# Patient Record
Sex: MALE | Race: WHITE | Employment: FULL TIME | URBAN - METROPOLITAN AREA
[De-identification: names, ages, dates, MRNs, and addresses within clinical notes are randomized per-mention and may not be internally consistent; named-entity substitution may affect disease eponyms.]

---

## 2017-06-13 ENCOUNTER — HOSPITAL ENCOUNTER (EMERGENCY)
Age: 72
Discharge: HOME | End: 2017-06-13
Attending: EMERGENCY MEDICINE
Payer: MEDICARE

## 2017-06-13 ENCOUNTER — APPOINTMENT (OUTPATIENT)
Dept: MRI IMAGING | Age: 72
End: 2017-06-13
Attending: EMERGENCY MEDICINE
Payer: MEDICARE

## 2017-06-13 ENCOUNTER — APPOINTMENT (OUTPATIENT)
Dept: GENERAL RADIOLOGY | Age: 72
End: 2017-06-13
Attending: EMERGENCY MEDICINE
Payer: MEDICARE

## 2017-06-13 VITALS
BODY MASS INDEX: 29.18 KG/M2 | DIASTOLIC BLOOD PRESSURE: 70 MMHG | HEIGHT: 69 IN | RESPIRATION RATE: 16 BRPM | WEIGHT: 197 LBS | SYSTOLIC BLOOD PRESSURE: 150 MMHG | TEMPERATURE: 97.7 F | HEART RATE: 71 BPM | OXYGEN SATURATION: 96 %

## 2017-06-13 DIAGNOSIS — M25.552 HIP PAIN, ACUTE, LEFT: Primary | ICD-10-CM

## 2017-06-13 PROCEDURE — 74011250636 HC RX REV CODE- 250/636: Performed by: EMERGENCY MEDICINE

## 2017-06-13 PROCEDURE — 96372 THER/PROPH/DIAG INJ SC/IM: CPT | Performed by: EMERGENCY MEDICINE

## 2017-06-13 PROCEDURE — 99284 EMERGENCY DEPT VISIT MOD MDM: CPT | Performed by: EMERGENCY MEDICINE

## 2017-06-13 PROCEDURE — 73721 MRI JNT OF LWR EXTRE W/O DYE: CPT

## 2017-06-13 PROCEDURE — 73502 X-RAY EXAM HIP UNI 2-3 VIEWS: CPT

## 2017-06-13 PROCEDURE — 74011250637 HC RX REV CODE- 250/637: Performed by: EMERGENCY MEDICINE

## 2017-06-13 RX ORDER — LISINOPRIL 2.5 MG/1
2.5 TABLET ORAL
COMMUNITY

## 2017-06-13 RX ORDER — HYDROCODONE BITARTRATE AND ACETAMINOPHEN 5; 325 MG/1; MG/1
1 TABLET ORAL
COMMUNITY
End: 2017-07-21

## 2017-06-13 RX ORDER — OXYCODONE AND ACETAMINOPHEN 5; 325 MG/1; MG/1
1 TABLET ORAL
Qty: 20 TAB | Refills: 0 | Status: SHIPPED | OUTPATIENT
Start: 2017-06-13 | End: 2017-06-20

## 2017-06-13 RX ORDER — HYDROMORPHONE HYDROCHLORIDE 1 MG/ML
1 INJECTION, SOLUTION INTRAMUSCULAR; INTRAVENOUS; SUBCUTANEOUS
Status: COMPLETED | OUTPATIENT
Start: 2017-06-13 | End: 2017-06-13

## 2017-06-13 RX ORDER — GABAPENTIN 300 MG/1
300 CAPSULE ORAL 2 TIMES DAILY
COMMUNITY

## 2017-06-13 RX ORDER — METFORMIN HYDROCHLORIDE 1000 MG/1
500 TABLET ORAL DAILY
COMMUNITY

## 2017-06-13 RX ORDER — ONDANSETRON 8 MG/1
8 TABLET, ORALLY DISINTEGRATING ORAL
Status: COMPLETED | OUTPATIENT
Start: 2017-06-13 | End: 2017-06-13

## 2017-06-13 RX ORDER — ATORVASTATIN CALCIUM 80 MG/1
80 TABLET, FILM COATED ORAL
COMMUNITY
End: 2021-07-21

## 2017-06-13 RX ORDER — DEXAMETHASONE SODIUM PHOSPHATE 100 MG/10ML
10 INJECTION INTRAMUSCULAR; INTRAVENOUS
Status: COMPLETED | OUTPATIENT
Start: 2017-06-13 | End: 2017-06-13

## 2017-06-13 RX ADMIN — DEXAMETHASONE SODIUM PHOSPHATE 10 MG: 10 INJECTION INTRAMUSCULAR; INTRAVENOUS at 16:53

## 2017-06-13 RX ADMIN — HYDROMORPHONE HYDROCHLORIDE 1 MG: 1 INJECTION, SOLUTION INTRAMUSCULAR; INTRAVENOUS; SUBCUTANEOUS at 16:54

## 2017-06-13 RX ADMIN — ONDANSETRON 8 MG: 8 TABLET, ORALLY DISINTEGRATING ORAL at 16:54

## 2017-06-13 NOTE — LETTER
[facility] [place] [address] [address] 
[phone] Work/School Note Date: 6/13/2017 To Whom It May concern: 
 
Shannon Her was seen and treated today in the emergency room by the following provider(s): 
No providers found. Shannon Her may return to work on 6/15/2017.  
 
Sincerely, 
 
 
 
 
Denisa Bellamy RN

## 2017-06-13 NOTE — DISCHARGE INSTRUCTIONS
Hip Pain: Care Instructions  Your Care Instructions  Hip pain may be caused by many things, including overuse, a fall, or a twisting movement. Another cause of hip pain is arthritis. Your pain may increase when you stand up, walk, or squat. The pain may come and go or may be constant. Home treatment can help relieve hip pain, swelling, and stiffness. If your pain is ongoing, you may need more tests and treatment. Follow-up care is a key part of your treatment and safety. Be sure to make and go to all appointments, and call your doctor if you are having problems. Its also a good idea to know your test results and keep a list of the medicines you take. How can you care for yourself at home? · Take pain medicines exactly as directed. ¨ If the doctor gave you a prescription medicine for pain, take it as prescribed. ¨ If you are not taking a prescription pain medicine, ask your doctor if you can take an over-the-counter medicine. · Rest and protect your hip. Take a break from any activity, including standing or walking, that may cause pain. · Put ice or a cold pack against your hip for 10 to 20 minutes at a time. Try to do this every 1 to 2 hours for the next 3 days (when you are awake) or until the swelling goes down. Put a thin cloth between the ice and your skin. · Sleep on your healthy side with a pillow between your knees, or sleep on your back with pillows under your knees. · If there is no swelling, you can put moist heat, a heating pad, or a warm cloth on your hip. Do gentle stretching exercises to help keep your hip flexible. · Learn how to prevent falls. Have your vision and hearing checked regularly. Wear slippers or shoes with a nonskid sole. · Stay at a healthy weight. · Wear comfortable shoes. When should you call for help? Call 911 anytime you think you may need emergency care. For example, call if:  · You have sudden chest pain and shortness of breath, or you cough up blood.   · You are not able to stand or walk or bear weight. · Your buttocks, legs, or feet feel numb or tingly. · Your leg or foot is cool or pale or changes color. · You have severe pain. Call your doctor now or seek immediate medical care if:  · You have signs of infection, such as:  ¨ Increased pain, swelling, warmth, or redness in the hip area. ¨ Red streaks leading from the hip area. ¨ Pus draining from the hip area. ¨ A fever. · You have signs of a blood clot, such as:  ¨ Pain in your calf, back of the knee, thigh, or groin. ¨ Redness and swelling in your leg or groin. · You are not able to bend, straighten, or move your leg normally. · You have trouble urinating or having bowel movements. Watch closely for changes in your health, and be sure to contact your doctor if:  · You do not get better as expected. Where can you learn more? Go to http://ciara-norm.info/. Enter J330 in the search box to learn more about \"Hip Pain: Care Instructions. \"  Current as of: May 27, 2016  Content Version: 11.2  © 5789-0771 [employer]. Care instructions adapted under license by [employer] (which disclaims liability or warranty for this information). If you have questions about a medical condition or this instruction, always ask your healthcare professional. [place] [address] any warranty or liability for your use of this information.

## 2017-06-13 NOTE — ED NOTES
I have reviewed discharge instructions with the patient. Prescription given to pt. The patient verbalized understanding. Patient denies any further needs, questions, or concerns at this time. No adverse reactions to any treatments, meds, or procedures noted. Pt signed hard copy d/c form.

## 2017-06-13 NOTE — ED TRIAGE NOTES
Pt states he hurt his left hip a week ago. Pt has been seen by 3 different doctors and all have told him its his muscle. Pt has been given pain pills and had x rays.

## 2017-06-13 NOTE — ED PROVIDER NOTES
HPI Comments: Patient complains of left hip pain for the last week. States it started when he stood up from a chair one week ago. Has been able to bear minimal weight, seen at 2 different urgent cares as well as the [place] in [facility]. X-rays were read as negative, and he was told the pain persists he may need an MRI. Patient is a 66 y.o. male presenting with hip pain. The history is provided by the patient and the spouse. Hip Injury    This is a new problem. The current episode started more than 1 week ago. The problem occurs constantly. The problem has been gradually worsening. The pain is present in the left hip. The quality of the pain is described as aching, sharp and constant. The pain is at a severity of 10/10. Associated symptoms include limited range of motion and stiffness. Pertinent negatives include no numbness, no tingling, no itching, no back pain and no neck pain. The symptoms are aggravated by palpation, standing and movement. He has tried rest (pain meds and steroids) for the symptoms. The treatment provided no relief. There has been no history of extremity trauma. Past Medical History:   Diagnosis Date    Diabetes [facility])     Neurological disorder        Past Surgical History:   Procedure Laterality Date    CARDIAC SURG PROCEDURE UNLIST           History reviewed. No pertinent family history. Social History     Social History    Marital status:      Spouse name: N/A    Number of children: N/A    Years of education: N/A     Occupational History    Not on file. Social History Main Topics    Smoking status: Former Smoker    Smokeless tobacco: Not on file    Alcohol use No    Drug use: No    Sexual activity: Not on file     Other Topics Concern    Not on file     Social History Narrative    No narrative on file         ALLERGIES: Review of patient's allergies indicates no known allergies. Review of Systems   Constitutional: Negative for chills and fever. Musculoskeletal: Positive for arthralgias and stiffness. Negative for back pain and neck pain. Skin: Negative for itching. Neurological: Negative for tingling and numbness. All other systems reviewed and are negative. Vitals:    06/13/17 1007   BP: 131/82   Pulse: 78   Resp: 18   Temp: 97.8 °F (36.6 °C)   SpO2: 96%   Weight: 89.4 kg (197 lb)   Height: 5' 8.5\" (1.74 m)            Physical Exam   Constitutional: He is oriented to person, place, and time. He appears well-developed and well-nourished. He appears distressed. HENT:   Head: Normocephalic and atraumatic. Right Ear: Tympanic membrane and external ear normal.   Left Ear: Tympanic membrane and external ear normal.   Mouth/Throat: Oropharynx is clear and moist.   Eyes: Conjunctivae and EOM are normal. Pupils are equal, round, and reactive to light. Neck: Normal range of motion. Neck supple. No tracheal deviation present. Cardiovascular: Normal rate, regular rhythm, normal heart sounds and intact distal pulses. Exam reveals no gallop and no friction rub. No murmur heard. Pulmonary/Chest: Effort normal and breath sounds normal. No respiratory distress. He has no wheezes. Abdominal: Soft. Bowel sounds are normal. He exhibits no distension and no mass. There is no hepatosplenomegaly. There is no tenderness. There is no rebound and no guarding. Musculoskeletal: He exhibits no edema. Left hip: He exhibits decreased range of motion and tenderness. He exhibits no swelling, no crepitus, no deformity and no laceration. Lymphadenopathy:     He has no cervical adenopathy. Neurological: He is alert and oriented to person, place, and time. He displays normal reflexes. No cranial nerve deficit. Skin: Skin is warm and dry. No rash noted. He is not diaphoretic. No erythema. Psychiatric: He has a normal mood and affect. Nursing note and vitals reviewed.        MDM  Number of Diagnoses or Management Options  Hip pain, acute, left: new and requires workup     Amount and/or Complexity of Data Reviewed  Tests in the radiology section of CPT®: ordered and reviewed  Obtain history from someone other than the patient: yes  Review and summarize past medical records: yes    Risk of Complications, Morbidity, and/or Mortality  Presenting problems: high  Diagnostic procedures: high  Management options: moderate    Patient Progress  Patient progress: improved    ED Course       Procedures    The patient was observed in the ED. Results Reviewed:      MRI HIP LT WO CONT   Final Result   IMPRESSION:   1. No acute musculoskeletal abnormality evident. 2. 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 3. Degenerative disc disease at L5-S1. XR HIP LT W OR WO PELV 2-3 VWS   Final Result   Impression:  No evidence of acute injury. I discussed the results of all labs, procedures, radiographs, and treatments with the patient and available family. Treatment plan is agreed upon and the patient is ready for discharge. All voiced understanding of the discharge plan and medication instructions or changes as appropriate. Questions about treatment in the ED were answered. All were encouraged to return should symptoms worsen or new problems develop.

## 2017-06-13 NOTE — ED NOTES
\"Pending outpatient medications\": hydrocodone 5mg/acetaminophen 325mg take 1 tab q 4 hours PRN; tramcinolone inj suspension 40mg IM once.

## 2017-07-12 ENCOUNTER — HOSPITAL ENCOUNTER (OUTPATIENT)
Dept: SURGERY | Age: 72
Discharge: HOME | End: 2017-07-12
Payer: COMMERCIAL

## 2017-07-12 VITALS
DIASTOLIC BLOOD PRESSURE: 72 MMHG | SYSTOLIC BLOOD PRESSURE: 117 MMHG | RESPIRATION RATE: 20 BRPM | HEART RATE: 87 BPM | HEIGHT: 69 IN | BODY MASS INDEX: 27.55 KG/M2 | TEMPERATURE: 97.6 F | WEIGHT: 186 LBS | OXYGEN SATURATION: 96 %

## 2017-07-12 LAB
ATRIAL RATE: 86 BPM
BACTERIA SPEC CULT: ABNORMAL
CALCULATED P AXIS, ECG09: 69 DEGREES
CALCULATED R AXIS, ECG10: 88 DEGREES
CALCULATED T AXIS, ECG11: 70 DEGREES
DIAGNOSIS, 93000: NORMAL
EST. AVERAGE GLUCOSE BLD GHB EST-MCNC: 217 MG/DL
GLUCOSE BLD STRIP.AUTO-MCNC: 161 MG/DL (ref 65–100)
HBA1C MFR BLD: 9.2 % (ref 4.8–6)
HGB BLD-MCNC: 14.7 G/DL (ref 13.6–17.2)
P-R INTERVAL, ECG05: 138 MS
Q-T INTERVAL, ECG07: 366 MS
QRS DURATION, ECG06: 104 MS
QTC CALCULATION (BEZET), ECG08: 437 MS
SERVICE CMNT-IMP: ABNORMAL
VENTRICULAR RATE, ECG03: 86 BPM

## 2017-07-12 PROCEDURE — 93005 ELECTROCARDIOGRAM TRACING: CPT | Performed by: ANESTHESIOLOGY

## 2017-07-12 PROCEDURE — 85018 HEMOGLOBIN: CPT | Performed by: ANESTHESIOLOGY

## 2017-07-12 PROCEDURE — 87641 MR-STAPH DNA AMP PROBE: CPT | Performed by: ANESTHESIOLOGY

## 2017-07-12 PROCEDURE — 77030027138 HC INCENT SPIROMETER -A

## 2017-07-12 PROCEDURE — 83036 HEMOGLOBIN GLYCOSYLATED A1C: CPT | Performed by: ANESTHESIOLOGY

## 2017-07-12 PROCEDURE — 82962 GLUCOSE BLOOD TEST: CPT

## 2017-07-12 RX ORDER — GUAIFENESIN 100 MG/5ML
81 LIQUID (ML) ORAL
COMMUNITY

## 2017-07-12 NOTE — PERIOP NOTES
POC glucose 161 . Instructed  Patient that if blood sugar 300 or > , surgery may be cancelled. MSSA/MRSA nasal swab done and sent to lab. Instructed pt that they would receive notification of results of positive swab. Education sheet regarding documentation of dosages with instructions for use of mupirocin ointment given. Pt aware to bring sheet on the day of surgery if ointment used and to  discard sheet if swab is negative . Patient verified name, , and surgery as listed in [facility] Care. TYPE  CASE:2  Orders per surgeon:   Received  Labs per surgeon:mrsa/mssa, hga1c: results pending  Labs per anesthesia protocol: hgb : results pending  EKG  :  Today per grid. Patient provided with handouts including guide to surgery , transfusions, pain management and hand hygiene for the family and community. Pt verbalizes understanding of all pre-op instructions . Instructed that family must be present in building at all times. Nothing to eat or drink after midnight the night prior to surgery. hibiclens  and instructions given per hospital policy. Sponge given. Incentive spirometry with return demonstration. Instructed patient to continue  previous medications as prescribed prior to surgery and  to take the following medications the day of surgery according to anesthesia guidelines : baby aspirin, atorvastatin, gabapentin, 1/2 of usual am dose of insulin, call [phone] for any blood sugar problems. Check blood sugar prior to insulin administration. May correct low blood sugar with sips of clear liquids only. norco if needed       Original medication prescription bottles not visualized during patient appointment. Continue all previous medications unless otherwise directed. Instructed patient to hold  the following medications prior to surgery: none          Patient verbalized understanding of all instructions and provided all medical/health information to the best of their ability.

## 2017-07-12 NOTE — PERIOP NOTES
Left message with Dr. Heath Doctor assistant that pt must resume baby aspirin due to two cardiac stents. Notified Dr. Alexx Mendoza that pt has been off his baby aspirin for 7 days and was instructed per this nurse to resume today.

## 2017-07-12 NOTE — PERIOP NOTES
Recent Results (from the past 12 hour(s))   MSSA/MRSA SC BY PCR, NASAL SWAB    Collection Time: 07/12/17 11:40 AM   Result Value Ref Range    Special Requests: NO SPECIAL REQUESTS      Culture result: (A)       MRSA target DNA not detected, SA target DNA detected. A MRSA negative, SA positive test result does not preclude MRSA nasal colonization.    HEMOGLOBIN A1C WITH EAG    Collection Time: 07/12/17 11:40 AM   Result Value Ref Range    Hemoglobin A1c 9.2 (H) 4.8 - 6.0 %    Est. average glucose 217 mg/dL   HEMOGLOBIN    Collection Time: 07/12/17 11:40 AM   Result Value Ref Range    HGB 14.7 13.6 - 17.2 g/dL   GLUCOSE, POC    Collection Time: 07/12/17 11:46 AM   Result Value Ref Range    Glucose (POC) 161 (H) 65 - 100 mg/dL

## 2017-07-12 NOTE — PERIOP NOTES
MRSA target DNA not detected, SA target DNA detected.   A MRSA negative, SA positive test result does not preclude MRSA nasal colonization    Left message with with Maranda Vicente at office of + swab and of pt's abnormal hga1c. Called rx for mupirocin joselin into [facility] and notified pt's wife to begin mupirocin joselin today for two doses intranasally , dose in the am and bring joselin to hospital on the [place]. Verified understanding.

## 2017-07-19 ENCOUNTER — ANESTHESIA EVENT (OUTPATIENT)
Dept: SURGERY | Age: 72
End: 2017-07-19
Payer: COMMERCIAL

## 2017-07-19 NOTE — H&P
Date of Service: 2017-07-12  Work Status:  ????? Allergies:????? Medications:Aspirin (81 MG); Ativan; Atorvastatin Calcium;Gabapentin;Lisinopril;MetFORMIN HCl;Norco;Oxycodone-Acetaminophen (5-325 MG); Percocet (5-325 MG, Take 1 po q 4-6 hours prn pain)    CC: Pre op    He is a 71-YO gentleman who had acute onset of low back and left leg pain on June 6th after getting up to a standing position. It does radiate into the anterior left thigh, but a lot of pain in the buttock. It feels like his buttock is swollen. He has taken some pain meds, but his pain has not resolved over time. He has also done NSAIDs and muscle relaxers. He is unable to do therapy because of the pain and he cant really sit for very long at all. He has an MRI scan that shows several levels of mild stenosis, but what looks acute is a disc herniation at L2-3 on the left with extruded fragment sitting underneath the L3 nerve root. I think this is the source of pain. We are planning to do surgery tomorrow. It will be a left L2-3 laminotomy/discectomy. We discussed the surgery in detail and hospital stay. I told him it is usually just overnight and then the next day we have him ambulate with therapy and he will have restrictions for 6 weeks and then we will get him back to regular activities. I talked about the dressing. I went through the risks including death, infection, paralysis, heart attack, stroke, bleeding, transfusion, clot in leg, clot in lung, dural tear, recurrent disc herniations and the facet I cannot guarantee pain relief. He understands. I answered all of his questions. EXAM:   He looks his age, but is healthy. No gross deformities. He is alert and oriented x 3. HEENT:  Pupils are somewhat constricted and minimally reactive to light. Oropharynx is clear. Neck is without adenopathy or bruits. Lungs are clear to auscultation bilaterally. Heart is RRR without murmur.  Abdomen is soft and nontender without any hepatosplenomegaly. I went through his medical history. He is an insulin dependent diabetic and really does not have other medical problems. He has had stents placed in his heart about 13 years ago. The only blood thinner he takes is ASA. He does have peripheral neuropathy in his feet from diabetes and takes Gabapentin. He has NKDA. PLAN:  Surgery tomorrow, left L2-3 discectomy.     Electronically Signed By Sandra BURK/dorothy

## 2017-07-20 ENCOUNTER — ANESTHESIA (OUTPATIENT)
Dept: SURGERY | Age: 72
End: 2017-07-20
Payer: COMMERCIAL

## 2017-07-20 ENCOUNTER — HOSPITAL ENCOUNTER (OUTPATIENT)
Age: 72
Setting detail: OBSERVATION
Discharge: HOME | End: 2017-07-21
Attending: ORTHOPAEDIC SURGERY | Admitting: ORTHOPAEDIC SURGERY
Payer: COMMERCIAL

## 2017-07-20 ENCOUNTER — APPOINTMENT (OUTPATIENT)
Dept: GENERAL RADIOLOGY | Age: 72
End: 2017-07-20
Attending: ORTHOPAEDIC SURGERY
Payer: COMMERCIAL

## 2017-07-20 PROBLEM — M51.26 LUMBAR HERNIATED DISC: Status: ACTIVE | Noted: 2017-07-20

## 2017-07-20 LAB
GLUCOSE BLD STRIP.AUTO-MCNC: 153 MG/DL (ref 65–100)
GLUCOSE BLD STRIP.AUTO-MCNC: 184 MG/DL (ref 65–100)
GLUCOSE BLD STRIP.AUTO-MCNC: 185 MG/DL (ref 65–100)
GLUCOSE BLD STRIP.AUTO-MCNC: 186 MG/DL (ref 65–100)

## 2017-07-20 PROCEDURE — 74011250636 HC RX REV CODE- 250/636

## 2017-07-20 PROCEDURE — 77030032490 HC SLV COMPR SCD KNE COVD -B: Performed by: ORTHOPAEDIC SURGERY

## 2017-07-20 PROCEDURE — 77030025623 HC BUR RND PRECIS STRY -D: Performed by: ORTHOPAEDIC SURGERY

## 2017-07-20 PROCEDURE — 77030019940 HC BLNKT HYPOTHRM STRY -B: Performed by: ANESTHESIOLOGY

## 2017-07-20 PROCEDURE — 74011636637 HC RX REV CODE- 636/637: Performed by: ORTHOPAEDIC SURGERY

## 2017-07-20 PROCEDURE — 74011250637 HC RX REV CODE- 250/637: Performed by: ORTHOPAEDIC SURGERY

## 2017-07-20 PROCEDURE — 82962 GLUCOSE BLOOD TEST: CPT

## 2017-07-20 PROCEDURE — 77030014647 HC SEAL FBRN TISSL BAXT -D: Performed by: ORTHOPAEDIC SURGERY

## 2017-07-20 PROCEDURE — 77030031139 HC SUT VCRL2 J&J -A: Performed by: ORTHOPAEDIC SURGERY

## 2017-07-20 PROCEDURE — 77030012894: Performed by: ORTHOPAEDIC SURGERY

## 2017-07-20 PROCEDURE — 74011000250 HC RX REV CODE- 250: Performed by: ORTHOPAEDIC SURGERY

## 2017-07-20 PROCEDURE — 74011000272 HC RX REV CODE- 272: Performed by: ORTHOPAEDIC SURGERY

## 2017-07-20 PROCEDURE — 77030008477 HC STYL SATN SLP COVD -A: Performed by: ANESTHESIOLOGY

## 2017-07-20 PROCEDURE — 77030003028 HC SUT VCRL J&J -A: Performed by: ORTHOPAEDIC SURGERY

## 2017-07-20 PROCEDURE — 77030030163 HC BN WAX J&J -A: Performed by: ORTHOPAEDIC SURGERY

## 2017-07-20 PROCEDURE — 76010000161 HC OR TIME 1 TO 1.5 HR INTENSV-TIER 1: Performed by: ORTHOPAEDIC SURGERY

## 2017-07-20 PROCEDURE — 74011250636 HC RX REV CODE- 250/636: Performed by: ORTHOPAEDIC SURGERY

## 2017-07-20 PROCEDURE — 77030018836 HC SOL IRR NACL ICUM -A: Performed by: ORTHOPAEDIC SURGERY

## 2017-07-20 PROCEDURE — 77030008703 HC TU ET UNCUF COVD -A: Performed by: ANESTHESIOLOGY

## 2017-07-20 PROCEDURE — 72020 X-RAY EXAM OF SPINE 1 VIEW: CPT

## 2017-07-20 PROCEDURE — 77030019908 HC STETH ESOPH SIMS -A: Performed by: ANESTHESIOLOGY

## 2017-07-20 PROCEDURE — 76210000007 HC OR PH I REC 5.5 TO 6 HR: Performed by: ORTHOPAEDIC SURGERY

## 2017-07-20 PROCEDURE — 99218 HC RM OBSERVATION: CPT

## 2017-07-20 PROCEDURE — 77030011640 HC PAD GRND REM COVD -A: Performed by: ORTHOPAEDIC SURGERY

## 2017-07-20 PROCEDURE — 74011250636 HC RX REV CODE- 250/636: Performed by: ANESTHESIOLOGY

## 2017-07-20 PROCEDURE — 74011250637 HC RX REV CODE- 250/637: Performed by: ANESTHESIOLOGY

## 2017-07-20 PROCEDURE — 74011000250 HC RX REV CODE- 250

## 2017-07-20 PROCEDURE — 76060000033 HC ANESTHESIA 1 TO 1.5 HR: Performed by: ORTHOPAEDIC SURGERY

## 2017-07-20 RX ORDER — ONDANSETRON 2 MG/ML
4 INJECTION INTRAMUSCULAR; INTRAVENOUS
Status: DISCONTINUED | OUTPATIENT
Start: 2017-07-20 | End: 2017-07-21 | Stop reason: HOSPADM

## 2017-07-20 RX ORDER — OXYCODONE HYDROCHLORIDE 5 MG/1
10 TABLET ORAL
Status: DISCONTINUED | OUTPATIENT
Start: 2017-07-20 | End: 2017-07-20 | Stop reason: HOSPADM

## 2017-07-20 RX ORDER — LISINOPRIL 5 MG/1
2.5 TABLET ORAL DAILY
Status: DISCONTINUED | OUTPATIENT
Start: 2017-07-21 | End: 2017-07-21 | Stop reason: HOSPADM

## 2017-07-20 RX ORDER — ROCURONIUM BROMIDE 10 MG/ML
INJECTION, SOLUTION INTRAVENOUS AS NEEDED
Status: DISCONTINUED | OUTPATIENT
Start: 2017-07-20 | End: 2017-07-20 | Stop reason: HOSPADM

## 2017-07-20 RX ORDER — BUPIVACAINE HYDROCHLORIDE AND EPINEPHRINE 5; 5 MG/ML; UG/ML
INJECTION, SOLUTION EPIDURAL; INTRACAUDAL; PERINEURAL AS NEEDED
Status: DISCONTINUED | OUTPATIENT
Start: 2017-07-20 | End: 2017-07-20 | Stop reason: HOSPADM

## 2017-07-20 RX ORDER — CEFAZOLIN SODIUM IN 0.9 % NACL 2 G/50 ML
2 INTRAVENOUS SOLUTION, PIGGYBACK (ML) INTRAVENOUS ONCE
Status: COMPLETED | OUTPATIENT
Start: 2017-07-20 | End: 2017-07-20

## 2017-07-20 RX ORDER — LIDOCAINE HYDROCHLORIDE 20 MG/ML
INJECTION, SOLUTION EPIDURAL; INFILTRATION; INTRACAUDAL; PERINEURAL AS NEEDED
Status: DISCONTINUED | OUTPATIENT
Start: 2017-07-20 | End: 2017-07-20 | Stop reason: HOSPADM

## 2017-07-20 RX ORDER — HYDROCODONE BITARTRATE AND ACETAMINOPHEN 7.5; 325 MG/1; MG/1
1 TABLET ORAL
Qty: 30 TAB | Refills: 0 | Status: SHIPPED | OUTPATIENT
Start: 2017-07-20 | End: 2021-07-21

## 2017-07-20 RX ORDER — LIDOCAINE HYDROCHLORIDE 10 MG/ML
0.1 INJECTION INFILTRATION; PERINEURAL AS NEEDED
Status: DISCONTINUED | OUTPATIENT
Start: 2017-07-20 | End: 2017-07-20 | Stop reason: HOSPADM

## 2017-07-20 RX ORDER — SODIUM CHLORIDE, SODIUM LACTATE, POTASSIUM CHLORIDE, CALCIUM CHLORIDE 600; 310; 30; 20 MG/100ML; MG/100ML; MG/100ML; MG/100ML
75 INJECTION, SOLUTION INTRAVENOUS CONTINUOUS
Status: DISCONTINUED | OUTPATIENT
Start: 2017-07-20 | End: 2017-07-20 | Stop reason: HOSPADM

## 2017-07-20 RX ORDER — GABAPENTIN 300 MG/1
300 CAPSULE ORAL 2 TIMES DAILY
Status: DISCONTINUED | OUTPATIENT
Start: 2017-07-20 | End: 2017-07-21 | Stop reason: HOSPADM

## 2017-07-20 RX ORDER — OXYCODONE HYDROCHLORIDE 5 MG/1
10 TABLET ORAL
Status: DISCONTINUED | OUTPATIENT
Start: 2017-07-20 | End: 2017-07-20

## 2017-07-20 RX ORDER — HYDROMORPHONE HYDROCHLORIDE 1 MG/ML
0.25 INJECTION, SOLUTION INTRAMUSCULAR; INTRAVENOUS; SUBCUTANEOUS
Status: DISCONTINUED | OUTPATIENT
Start: 2017-07-20 | End: 2017-07-20

## 2017-07-20 RX ORDER — ONDANSETRON 2 MG/ML
INJECTION INTRAMUSCULAR; INTRAVENOUS AS NEEDED
Status: DISCONTINUED | OUTPATIENT
Start: 2017-07-20 | End: 2017-07-20 | Stop reason: HOSPADM

## 2017-07-20 RX ORDER — HYDROMORPHONE HYDROCHLORIDE 1 MG/ML
1 INJECTION, SOLUTION INTRAMUSCULAR; INTRAVENOUS; SUBCUTANEOUS
Status: DISCONTINUED | OUTPATIENT
Start: 2017-07-20 | End: 2017-07-21 | Stop reason: HOSPADM

## 2017-07-20 RX ORDER — IBUPROFEN 400 MG/1
400 TABLET ORAL
Status: DISCONTINUED | OUTPATIENT
Start: 2017-07-20 | End: 2017-07-21 | Stop reason: HOSPADM

## 2017-07-20 RX ORDER — GLYCOPYRROLATE 0.2 MG/ML
INJECTION INTRAMUSCULAR; INTRAVENOUS AS NEEDED
Status: DISCONTINUED | OUTPATIENT
Start: 2017-07-20 | End: 2017-07-20 | Stop reason: HOSPADM

## 2017-07-20 RX ORDER — FENTANYL CITRATE 50 UG/ML
INJECTION, SOLUTION INTRAMUSCULAR; INTRAVENOUS AS NEEDED
Status: DISCONTINUED | OUTPATIENT
Start: 2017-07-20 | End: 2017-07-20 | Stop reason: HOSPADM

## 2017-07-20 RX ORDER — ONDANSETRON 2 MG/ML
4 INJECTION INTRAMUSCULAR; INTRAVENOUS
Status: DISCONTINUED | OUTPATIENT
Start: 2017-07-20 | End: 2017-07-20

## 2017-07-20 RX ORDER — NEOSTIGMINE METHYLSULFATE 1 MG/ML
INJECTION INTRAVENOUS AS NEEDED
Status: DISCONTINUED | OUTPATIENT
Start: 2017-07-20 | End: 2017-07-20 | Stop reason: HOSPADM

## 2017-07-20 RX ORDER — NALBUPHINE HYDROCHLORIDE 20 MG/ML
5 INJECTION, SOLUTION INTRAMUSCULAR; INTRAVENOUS; SUBCUTANEOUS
Status: DISCONTINUED | OUTPATIENT
Start: 2017-07-20 | End: 2017-07-20

## 2017-07-20 RX ORDER — DOCUSATE SODIUM 100 MG/1
100 CAPSULE, LIQUID FILLED ORAL
Status: DISCONTINUED | OUTPATIENT
Start: 2017-07-20 | End: 2017-07-21 | Stop reason: HOSPADM

## 2017-07-20 RX ORDER — PROPOFOL 10 MG/ML
INJECTION, EMULSION INTRAVENOUS AS NEEDED
Status: DISCONTINUED | OUTPATIENT
Start: 2017-07-20 | End: 2017-07-20 | Stop reason: HOSPADM

## 2017-07-20 RX ORDER — SODIUM CHLORIDE 0.9 % (FLUSH) 0.9 %
5-10 SYRINGE (ML) INJECTION AS NEEDED
Status: DISCONTINUED | OUTPATIENT
Start: 2017-07-20 | End: 2017-07-21 | Stop reason: HOSPADM

## 2017-07-20 RX ORDER — SODIUM CHLORIDE 0.9 % (FLUSH) 0.9 %
5-10 SYRINGE (ML) INJECTION EVERY 8 HOURS
Status: DISCONTINUED | OUTPATIENT
Start: 2017-07-20 | End: 2017-07-21 | Stop reason: HOSPADM

## 2017-07-20 RX ORDER — CYCLOBENZAPRINE HCL 10 MG
5 TABLET ORAL
Status: DISCONTINUED | OUTPATIENT
Start: 2017-07-20 | End: 2017-07-21 | Stop reason: HOSPADM

## 2017-07-20 RX ORDER — HYDROMORPHONE HYDROCHLORIDE 2 MG/ML
0.5 INJECTION, SOLUTION INTRAMUSCULAR; INTRAVENOUS; SUBCUTANEOUS
Status: DISCONTINUED | OUTPATIENT
Start: 2017-07-20 | End: 2017-07-20 | Stop reason: HOSPADM

## 2017-07-20 RX ORDER — SODIUM CHLORIDE 0.9 % (FLUSH) 0.9 %
5-10 SYRINGE (ML) INJECTION AS NEEDED
Status: DISCONTINUED | OUTPATIENT
Start: 2017-07-20 | End: 2017-07-20

## 2017-07-20 RX ORDER — HYDROCODONE BITARTRATE AND ACETAMINOPHEN 10; 325 MG/1; MG/1
1 TABLET ORAL
Status: DISCONTINUED | OUTPATIENT
Start: 2017-07-20 | End: 2017-07-21 | Stop reason: HOSPADM

## 2017-07-20 RX ORDER — NALOXONE HYDROCHLORIDE 0.4 MG/ML
0.1 INJECTION, SOLUTION INTRAMUSCULAR; INTRAVENOUS; SUBCUTANEOUS AS NEEDED
Status: DISCONTINUED | OUTPATIENT
Start: 2017-07-20 | End: 2017-07-20

## 2017-07-20 RX ORDER — FLUMAZENIL 0.1 MG/ML
0.2 INJECTION INTRAVENOUS AS NEEDED
Status: DISCONTINUED | OUTPATIENT
Start: 2017-07-20 | End: 2017-07-20 | Stop reason: HOSPADM

## 2017-07-20 RX ORDER — DIPHENHYDRAMINE HCL 25 MG
25 CAPSULE ORAL
Status: DISCONTINUED | OUTPATIENT
Start: 2017-07-20 | End: 2017-07-21 | Stop reason: HOSPADM

## 2017-07-20 RX ORDER — ATORVASTATIN CALCIUM 40 MG/1
80 TABLET, FILM COATED ORAL DAILY
Status: DISCONTINUED | OUTPATIENT
Start: 2017-07-21 | End: 2017-07-21 | Stop reason: HOSPADM

## 2017-07-20 RX ORDER — DIPHENHYDRAMINE HYDROCHLORIDE 50 MG/ML
12.5 INJECTION, SOLUTION INTRAMUSCULAR; INTRAVENOUS
Status: DISCONTINUED | OUTPATIENT
Start: 2017-07-20 | End: 2017-07-20 | Stop reason: HOSPADM

## 2017-07-20 RX ORDER — OXYCODONE HYDROCHLORIDE 5 MG/1
5 TABLET ORAL
Status: DISCONTINUED | OUTPATIENT
Start: 2017-07-20 | End: 2017-07-20

## 2017-07-20 RX ORDER — OXYCODONE HYDROCHLORIDE 5 MG/1
5 TABLET ORAL
Status: DISCONTINUED | OUTPATIENT
Start: 2017-07-20 | End: 2017-07-20 | Stop reason: HOSPADM

## 2017-07-20 RX ORDER — SODIUM CHLORIDE, SODIUM LACTATE, POTASSIUM CHLORIDE, CALCIUM CHLORIDE 600; 310; 30; 20 MG/100ML; MG/100ML; MG/100ML; MG/100ML
100 INJECTION, SOLUTION INTRAVENOUS CONTINUOUS
Status: DISCONTINUED | OUTPATIENT
Start: 2017-07-20 | End: 2017-07-21 | Stop reason: HOSPADM

## 2017-07-20 RX ORDER — METFORMIN HYDROCHLORIDE 500 MG/1
1000 TABLET ORAL 2 TIMES DAILY WITH MEALS
Status: DISCONTINUED | OUTPATIENT
Start: 2017-07-20 | End: 2017-07-21 | Stop reason: HOSPADM

## 2017-07-20 RX ORDER — FAMOTIDINE 20 MG/1
20 TABLET, FILM COATED ORAL EVERY 12 HOURS
Status: DISCONTINUED | OUTPATIENT
Start: 2017-07-20 | End: 2017-07-21 | Stop reason: HOSPADM

## 2017-07-20 RX ORDER — CEFAZOLIN SODIUM IN 0.9 % NACL 2 G/50 ML
2 INTRAVENOUS SOLUTION, PIGGYBACK (ML) INTRAVENOUS EVERY 8 HOURS
Status: COMPLETED | OUTPATIENT
Start: 2017-07-20 | End: 2017-07-21

## 2017-07-20 RX ORDER — NALOXONE HYDROCHLORIDE 0.4 MG/ML
0.1 INJECTION, SOLUTION INTRAMUSCULAR; INTRAVENOUS; SUBCUTANEOUS
Status: DISCONTINUED | OUTPATIENT
Start: 2017-07-20 | End: 2017-07-20 | Stop reason: HOSPADM

## 2017-07-20 RX ORDER — SODIUM CHLORIDE 0.9 % (FLUSH) 0.9 %
5-10 SYRINGE (ML) INJECTION EVERY 8 HOURS
Status: DISCONTINUED | OUTPATIENT
Start: 2017-07-20 | End: 2017-07-20

## 2017-07-20 RX ADMIN — CEFAZOLIN 2 G: 1 INJECTION, POWDER, FOR SOLUTION INTRAMUSCULAR; INTRAVENOUS; PARENTERAL at 16:10

## 2017-07-20 RX ADMIN — LIDOCAINE HYDROCHLORIDE 60 MG: 20 INJECTION, SOLUTION EPIDURAL; INFILTRATION; INTRACAUDAL; PERINEURAL at 07:38

## 2017-07-20 RX ADMIN — FAMOTIDINE 20 MG: 20 TABLET ORAL at 20:51

## 2017-07-20 RX ADMIN — PROPOFOL 150 MG: 10 INJECTION, EMULSION INTRAVENOUS at 07:38

## 2017-07-20 RX ADMIN — SODIUM CHLORIDE, SODIUM LACTATE, POTASSIUM CHLORIDE, AND CALCIUM CHLORIDE: 600; 310; 30; 20 INJECTION, SOLUTION INTRAVENOUS at 08:28

## 2017-07-20 RX ADMIN — ROCURONIUM BROMIDE 40 MG: 10 INJECTION, SOLUTION INTRAVENOUS at 07:38

## 2017-07-20 RX ADMIN — Medication 10 ML: at 16:10

## 2017-07-20 RX ADMIN — ONDANSETRON 4 MG: 2 INJECTION INTRAMUSCULAR; INTRAVENOUS at 08:25

## 2017-07-20 RX ADMIN — METFORMIN HYDROCHLORIDE 1000 MG: 500 TABLET, FILM COATED ORAL at 01:00

## 2017-07-20 RX ADMIN — NEOSTIGMINE METHYLSULFATE 4 MG: 1 INJECTION INTRAVENOUS at 08:35

## 2017-07-20 RX ADMIN — INSULIN HUMAN 55 UNITS: 100 INJECTION, SUSPENSION SUBCUTANEOUS at 20:54

## 2017-07-20 RX ADMIN — HYDROCODONE BITARTRATE AND ACETAMINOPHEN 1 TABLET: 10; 325 TABLET ORAL at 16:09

## 2017-07-20 RX ADMIN — FENTANYL CITRATE 50 MCG: 50 INJECTION, SOLUTION INTRAMUSCULAR; INTRAVENOUS at 08:01

## 2017-07-20 RX ADMIN — OXYCODONE HYDROCHLORIDE 10 MG: 5 TABLET ORAL at 09:41

## 2017-07-20 RX ADMIN — DOCUSATE SODIUM 100 MG: 100 CAPSULE, LIQUID FILLED ORAL at 11:57

## 2017-07-20 RX ADMIN — SODIUM CHLORIDE, SODIUM LACTATE, POTASSIUM CHLORIDE, AND CALCIUM CHLORIDE 100 ML/HR: 600; 310; 30; 20 INJECTION, SOLUTION INTRAVENOUS at 16:10

## 2017-07-20 RX ADMIN — FAMOTIDINE 20 MG: 20 TABLET ORAL at 16:09

## 2017-07-20 RX ADMIN — ROCURONIUM BROMIDE 10 MG: 10 INJECTION, SOLUTION INTRAVENOUS at 07:55

## 2017-07-20 RX ADMIN — SODIUM CHLORIDE, SODIUM LACTATE, POTASSIUM CHLORIDE, AND CALCIUM CHLORIDE 75 ML/HR: 600; 310; 30; 20 INJECTION, SOLUTION INTRAVENOUS at 06:25

## 2017-07-20 RX ADMIN — GABAPENTIN 300 MG: 300 CAPSULE ORAL at 16:09

## 2017-07-20 RX ADMIN — HYDROMORPHONE HYDROCHLORIDE 1 MG: 1 INJECTION, SOLUTION INTRAMUSCULAR; INTRAVENOUS; SUBCUTANEOUS at 20:51

## 2017-07-20 RX ADMIN — GLYCOPYRROLATE 0.6 MG: 0.2 INJECTION INTRAMUSCULAR; INTRAVENOUS at 08:35

## 2017-07-20 RX ADMIN — FENTANYL CITRATE 100 MCG: 50 INJECTION, SOLUTION INTRAMUSCULAR; INTRAVENOUS at 07:38

## 2017-07-20 RX ADMIN — CEFAZOLIN 2 G: 1 INJECTION, POWDER, FOR SOLUTION INTRAMUSCULAR; INTRAVENOUS; PARENTERAL at 07:41

## 2017-07-20 RX ADMIN — Medication 10 ML: at 22:57

## 2017-07-20 RX ADMIN — FENTANYL CITRATE 50 MCG: 50 INJECTION, SOLUTION INTRAMUSCULAR; INTRAVENOUS at 08:19

## 2017-07-20 NOTE — ANESTHESIA PROCEDURE NOTES
Epidural Block    Start time: 7/20/2017 7:07 AM  End time: 7/20/2017 7:13 AM  Performed by: Richard Weinberg by: Ivory Mendenhall     Pre-Procedure  Indication: at surgeon's request and post-op pain management    Preanesthetic Checklist: patient identified, risks and benefits discussed, anesthesia consent, site marked, patient being monitored, timeout performed and anesthesia consent    Timeout Time: 07:06        Epidural:   Patient position:  Seated  Prep region:  Thoracic  Prep: Patient draped and Chlorhexidine    Location:  T9-10    Needle and Epidural Catheter:   Needle Type:  Tuohy  Needle Gauge:  19 G  Injection Technique:  Loss of resistance using saline  Attempts:  1  Catheter Size:  19 G  Catheter at Skin Depth (cm):  12  Depth in Epidural Space (cm):  6  Events: no blood with aspiration, no cerebrospinal fluid with aspiration, no paresthesia and negative aspiration test    Test Dose:  Lidocaine 1.5% w/ epi and negative    Assessment:   Catheter Secured:  Tegaderm and tape  Insertion:  Uncomplicated  Patient tolerance:  Patient tolerated the procedure well with no immediate complications

## 2017-07-20 NOTE — IP AVS SNAPSHOT
Allen Carlos 
 
 
 [facility] [address] [facility] 
[phone] Patient: Jacky Harp MRN: [id number] OEP:7/4/[birth year]99 You are allergic to the following No active allergies Recent Documentation Height Weight BMI Smoking Status 1.74 m 82.4 kg 27.2 kg/m2 Former Smoker Emergency Contacts Name Discharge Info Relation Home Work Mobile Terri Cowart  Spouse [3] [phone] [facility]  Daughter [21] [phone] About your hospitalization You were admitted on:  July 20, 2017 You last received care in the:  [facility] 7 MED SURG You were discharged on:  July 21, 2017 Unit phone number:  [phone] Why you were hospitalized Your primary diagnosis was:  Lumbar Herniated Disc Providers Seen During Your Hospitalizations Provider Role Specialty Primary office phone Harpreet Huffman MD Attending Provider Orthopedic Surgery [phone] Your Primary Care Physician (PCP) Primary Care Physician Office Phone Office Fax OTHER, ELIECER ** None ** ** None ** Follow-up Information Follow up With Details Comments Contact Info Eliecer Rhoades MD Call As needed Patient can only remember the practice name and not the physician Harpreet Huffman MD On 8/3/2017 11:30 AM @ [address] [place] [address] [facility] [address] 
[phone] Current Discharge Medication List  
  
START taking these medications Dose & Instructions Dispensing Information Comments Morning Noon Evening Bedtime HYDROcodone-acetaminophen 7.5-325 mg per tablet Commonly known as:  Eduardo Nash Replaces:  NORCO 5-325 mg per tablet Your last dose was:  6:02 a.m. Your next dose is:  10:02 a.m. Dose:  1 Tab Take 1 Tab by mouth every six (6) hours as needed for Pain. Max Daily Amount: 4 Tabs. Quantity:  30 Tab Refills:  0 CONTINUE these medications which have NOT CHANGED Dose & Instructions Dispensing Information Comments Morning Noon Evening Bedtime  
 aspirin 81 mg chewable tablet Your next dose is:  Tomorrow Morning Dose:  81 mg Take 81 mg by mouth every morning. Indications: has held for 7-8 days per Dr. Stas Rivas, pt has 2 cardiac stents. instructed pt  to resume today. Refills:  0  
     
  
   
   
   
  
 atorvastatin 80 mg tablet Commonly known as:  LIPITOR Your next dose is:  Tomorrow Morning Dose:  80 mg Take 80 mg by mouth every morning. Indications: hypercholesterolemia Refills:  0 BACTROBAN NASAL 2 % nasal ointment Generic drug:  mupirocin calcium  
   
 by Both Nostrils route two (2) times a day. Refills:  0  
     
   
   
   
  
 dextran 70-hypromellose ophthalmic solution Commonly known as:  ARTIFICIAL TEARS Your next dose is: Take on as needed schedule Dose:  1 Drop Administer 1 Drop to both eyes four (4) times daily as needed. Indications: DRY EYE Refills:  0  
     
   
   
   
  
 gabapentin 300 mg capsule Commonly known as:  NEURONTIN Your next dose is:  TODAY, Resume home schedule Dose:  300 mg Take 300 mg by mouth two (2) times a day. Indications: NEUROPATHIC PAIN, 1 cap q morning and 2 cap qhs for pain Refills:  0  
     
  
   
   
  
   
  
 insulin NPH/insulin regular 100 unit/mL (70-30) injection Commonly known as:  NOVOLIN 70/30, HUMULIN 70/30 Your next dose is:  TODAY, Resume home schedule Dose:  55 Units 55 Units by SubCUTAneous route two (2) times a day. Indications: type 2 diabetes mellitus Refills:  0 LEXAPRO PO Your next dose is:  THIS EVENING Dose:  0.5 Tab Take 0.5 Tabs by mouth every evening. Indications: does not know dosage Refills:  0 lisinopril 2.5 mg tablet Commonly known as:  Mina Boehringer Your next dose is:  Tomorrow Morning Dose:  2.5 mg Take 2.5 mg by mouth every morning. Indications: hypertension, \"for BP or heart\" Refills:  0  
     
  
   
   
   
  
 metFORMIN 1,000 mg tablet Commonly known as:  GLUCOPHAGE Your next dose is:  TODAY, with evening meal  
   
 Dose:  1000 mg Take 1,000 mg by mouth two (2) times daily (with meals). Indications: type 2 diabetes mellitus Refills:  0 STOP taking these medications NORCO 5-325 mg per tablet Generic drug:  HYDROcodone-acetaminophen Replaced by:  HYDROcodone-acetaminophen 7.5-325 mg per tablet Where to Get Your Medications Information on where to get these meds will be given to you by the nurse or doctor. ! Ask your nurse or doctor about these medications HYDROcodone-acetaminophen 7.5-325 mg per tablet Discharge Instructions [place]  [place] Lumbar Discectomy: What to Expect at [facility] Your Recovery Discectomy is surgery to remove part or all of a bulging (herniated) disc in the spine. A bulging disc may press on the spinal cord or spinal nerves and cause leg pain and numbness. Your doctor made a 1- to 2-inch cut (incision) in the skin over the spine. He then used surgical tools through the incision to do the surgery. You can expect your back to feel stiff or sore after surgery. This should improve in the weeks after surgery. You may have relief from your symptoms right away, or you may get better over days or weeks. In the weeks after your surgery, it may be hard to sit or  one position for very long and you may need pain medicine. It may take 8 weeks or longer to get back to doing your usual activities. Your doctor may advise you to work with a physical therapist to strengthen the muscles around your spine and trunk.  You will need to learn how to lift, twist, and bend so you do not put too much strain on your back. The pain or numbness you had in your legs before surgery should get better or go away completely. This care sheet gives you a general idea about how long it will take for you to recover. But each person recovers at a different pace. Follow the steps below to get better as quickly as possible. How can you care for yourself at home? Activity · Rest when you feel tired. Getting enough sleep will help you recover. · Try to walk each day. Start by walking a little more than you did the day before. Bit by bit, increase the amount you walk. Walking is a gentle exercise and helps prevent pneumonia and constipation. Walking may also decrease your muscle soreness after surgery. · If advised by your doctor, you may need to avoid lifting anything that would cause excessive strain on your back. This may include heavy grocery bags and milk containers, a heavy briefcase or backpack, cat litter or dog food bags, a child, or a vacuum . · Avoid strenuous activities, such as bicycle riding, jogging, weight lifting, or aerobic exercise, until your doctor says it is okay. · Ask your doctor when you can drive again. · Avoid riding in a car for more than 30 minutes at a time for 2 to 4 weeks after surgery. If you must ride in a car for a longer distance, stop often to walk and stretch your legs. · Try to change your position about every 30 minutes while you sit or stand. This will help decrease your back pain while you heal. 
· Your time off from work depends on how quickly you feel better and on the type of work you do. If you work in an office, you likely can go back to work sooner than if you have a job where you are very active. Talk with your doctor about your work needs. · You may have sex as soon as you feel able, but avoid positions that put stress on your back or cause pain. Diet · You can eat your normal diet. If your stomach is upset, try bland, low-fat foods like plain rice, broiled chicken, toast, and yogurt. · Drink plenty of fluids (unless your doctor tells you not to). · You may notice that your bowel movements are not regular right after your surgery. This is common. Try to avoid constipation and straining with bowel movements. You may want to take a fiber supplement every day. If you have not had a bowel movement after a couple of days, take a mild laxative. Medicines · Take pain medicines exactly as directed. ¨ If the doctor gave you a prescription medicine for pain, take it as prescribed. ¨ If you are not taking a prescription pain medicine, ask your doctor if you can take an over-the-counter medicine. ¨ Do not take two or more pain medicines at the same time unless the doctor told you to. Many pain medicines have acetaminophen, which is Tylenol. Too much acetaminophen (Tylenol) can be harmful. · If you think your pain medicine is making you sick to your stomach: 
¨ Take your medicine after meals (unless your doctor has told you not to). ¨ Ask your doctor for a different pain medicine. · If your doctor prescribed antibiotics, take them as directed. Do not stop taking them just because you feel better. You need to take the full course of antibiotics. Incision care · If you have strips of tape on the cut (incision) the doctor made, leave the tape on for a week or until it falls off. · Gently rinse the area daily with warm, soapy water, and pat it dry. Make sure you understand how to care for your incision before you leave the hospital. 
· Keep the area clean and dry. You may cover it with a gauze bandage if it weeps or rubs against clothing. Change the bandage every day. Exercise · Do back exercises as instructed by your doctor. · Your doctor may recommend that you work with a physical therapist to improve the strength and flexibility of your back. Other instructions · After your incision heals, about 5 to 7 days after surgery, you can use ice, a heating pad, a hot water bottle, or gentle massage on your back to reduce stiffness. Follow-up care is a key part of your treatment and safety. Be sure to make and go to all appointments, and call your doctor if you are having problems. Its also a good idea to know your test results and keep a list of the medicines you take. [employer] [phone] When should you call for help? Call 911 anytime you think you may need emergency care. For example, call if: 
· You pass out (lose consciousness). · You have sudden chest pain and shortness of breath, or you cough up blood. · You lose bladder or bowel control. · One or both legs suddenly feel weak or numb. Call your doctor now or seek immediate medical care if: 
· You have pain that does not get better after you take pain medicine. · You have a headache that does not get better after you take medicine for it. · You have loose stitches, or your incision comes open. · You have signs of infection, such as: 
¨ Increased pain, swelling, warmth, or redness. ¨ Red streaks leading from the incision. ¨ Pus draining from the incision. ¨ Swollen lymph nodes in your neck, armpits or groin. ¨ A fever. · You have blood or fluid draining from the incision. Watch closely for changes in your health, and be sure to contact your doctor if: 
· You have new numbness or tingling in your legs. · You have new pain or weakness in your legs. · You do not have a bowel movement after taking a laxative. Where can you learn more? Go to DealExplorer.be. Enter S010 in the search box to learn more about \"Lumbar Microdiscectomy: What to Expect at Home. \"  
© 5102-3944 Healthwise, [employer].  Care instructions adapted under license by [facility] [employer] (which disclaims liability or warranty for this information). This care instruction is for use with your licensed healthcare professional. If you have questions about a medical condition or this instruction, always ask your healthcare professional. [employer] any warranty or liability for your use of this information. DISCHARGE SUMMARY from Nurse The following personal items are in your possession at time of discharge: 
 
Dental Appliances: Partials, At home (upper & lower partials) Visual Aid: Glasses Jewelry: None Clothing: Shirt, Pants, Footwear, Undergarments Other Valuables: None PATIENT INSTRUCTIONS: 
 
 
F-face looks uneven A-arms unable to move or move unevenly S-speech slurred or non-existent T-time-call 911 as soon as signs and symptoms begin-DO NOT go Back to bed or wait to see if you get better-TIME IS BRAIN. Warning Signs of HEART ATTACK Call 911 if you have these symptoms: 
? Chest discomfort. Most heart attacks involve discomfort in the center of the chest that lasts more than a few minutes, or that goes away and comes back. It can feel like uncomfortable pressure, squeezing, fullness, or pain. ? Discomfort in other areas of the upper body. Symptoms can include pain or discomfort in one or both arms, the back, neck, jaw, or stomach. ? Shortness of breath with or without chest discomfort. ? Other signs may include breaking out in a cold sweat, nausea, or lightheadedness. Don't wait more than five minutes to call [address]! Fast action can save your life. Calling 911 is almost always the fastest way to get lifesaving treatment. Emergency Medical Services staff can begin treatment when they arrive  up to an hour sooner than if someone gets to the hospital by car. The discharge information has been reviewed with the patient.   The patient verbalized understanding. Discharge medications reviewed with the patient and appropriate educational materials and side effects teaching were provided. Discharge Orders None Introducing [facility]! Dear Carmita Laughter: Thank you for requesting a [employer] account. Our records indicate that you already have an active [employer] account. You can access your account anytime at [email or web address] Did you know that you can access your hospital and ER discharge instructions at any time in [employer]? You can also review all of your test results from your hospital stay or ER visit. Additional Information If you have questions, please visit the Frequently Asked Questions section of the [employer] website at [email or web address] Remember, [employer] is NOT to be used for urgent needs. For medical emergencies, dial 911. Now available from your iPhone and Android! General Information Please provide this summary of care documentation to your next provider. Patient Signature:  ____________________________________________________________ Date:  ____________________________________________________________  
  
Wes Santillan Provider Signature:  ____________________________________________________________ Date:  ____________________________________________________________

## 2017-07-20 NOTE — ANESTHESIA PREPROCEDURE EVALUATION
Anesthetic History   No history of anesthetic complications            Review of Systems / Medical History  Patient summary reviewed, nursing notes reviewed and pertinent labs reviewed    Pulmonary            Asthma : well controlled       Neuro/Psych   Within defined limits           Cardiovascular    Hypertension          CAD, cardiac stents (s/p YOSEPH 2012 - remains on bASA) and hyperlipidemia    Exercise tolerance: >4 METS     GI/Hepatic/Renal     GERD: well controlled           Endo/Other    Diabetes: well controlled, type 2, using insulin    Arthritis     Other Findings            Physical Exam    Airway  Mallampati: II  TM Distance: > 6 cm  Neck ROM: normal range of motion   Mouth opening: Normal     Cardiovascular    Rhythm: regular  Rate: normal         Dental  No notable dental hx       Pulmonary  Breath sounds clear to auscultation               Abdominal         Other Findings            Anesthetic Plan    ASA: 2  Anesthesia type: general      Post-op pain plan if not by surgeon: indwelling epidural catheter      Anesthetic plan and risks discussed with: Patient and Family

## 2017-07-20 NOTE — ANESTHESIA POSTPROCEDURE EVALUATION
Post-Anesthesia Evaluation and Assessment    Patient: Kaitlynn Erazo MRN: [id number]  SSN: [id number]    YOB: 1945  Age: 66 y.o. Sex: male       Cardiovascular Function/Vital Signs  Visit Vitals    BP 94/58    Pulse 86    Temp 36.4 °C (97.5 °F)    Resp 16    Ht 5' 8.5\" (1.74 m)    Wt 82.4 kg (181 lb 9 oz)    SpO2 94%    BMI 27.2 kg/m2       Patient is status post general anesthesia for Procedure(s):  LEFT L2-L3 HEMILAMINOTOMY. Nausea/Vomiting: None    Postoperative hydration reviewed and adequate. Pain:  Pain Scale 1: Numeric (0 - 10) (07/20/17 1015)  Pain Intensity 1: 0 (07/20/17 1015)   Managed    Neurological Status:   Neuro (WDL): Exceptions to WDL (no change in , dorsi/plantar flexion) (07/20/17 0923)  Neuro  Neurologic State: Drowsy (07/20/17 0913)  Orientation Level: Disoriented X4 (07/20/17 7003)  Cognition: Appropriate decision making; Follows commands (07/20/17 0943)  Speech: Clear (07/20/17 0923)  LUE Motor Response: Purposeful (07/20/17 0923)  LLE Motor Response: Purposeful (07/20/17 0923)  RUE Motor Response: Purposeful (07/20/17 0923)  RLE Motor Response: Purposeful (07/20/17 0923)   At baseline    Mental Status and Level of Consciousness: Arousable    Pulmonary Status:   O2 Device: Nasal cannula (07/20/17 0852)   Adequate oxygenation and airway patent    Complications related to anesthesia: None    Post-anesthesia assessment completed.  No concerns    Signed By: Jose Pike MD     July 20, 2017

## 2017-07-20 NOTE — PERIOP NOTES
12:23 PM  Report to ViolettaBC. TRANSFER - OUT REPORT:    Verbal report given to BC Fisher(name) on Leo Lares  being transferred to [facility](unit) for routine post - op       Report consisted of patients Situation, Background, Assessment and   Recommendations(SBAR). Information from the following report(s) SBAR, OR Summary, Procedure Summary, Intake/Output and MAR was reviewed with the receiving nurse. Lines:   Peripheral IV 07/20/17 Right Forearm (Active)   Site Assessment Clean, dry, & intact 7/20/2017  9:23 AM   Phlebitis Assessment 0 7/20/2017  9:23 AM   Infiltration Assessment 0 7/20/2017  9:23 AM   Dressing Status Clean, dry, & intact 7/20/2017  9:23 AM   Dressing Type Tape;Transparent 7/20/2017  9:23 AM   Hub Color/Line Status Green; Infusing 7/20/2017  9:23 AM   Alcohol Cap Used No 7/20/2017  9:23 AM        Opportunity for questions and clarification was provided. Patient transported with:   O2 @ 2 liters    VTE prophylaxis orders have been written for Leo Lares. Patient and family given floor number and nurses name. Family updated re: pt status after security code verified.

## 2017-07-20 NOTE — BRIEF OP NOTE
BRIEF OPERATIVE NOTE    Date of Procedure: 7/20/2017   Preoperative Diagnosis: Herniated nucleus pulposus of lumbosacral region [M51.27]  Postoperative Diagnosis: Herniated nucleus pulposus of lumbosacral region [M51.27]    Procedure(s):  LEFT L2-L3 HEMILAMINOTOMY  Surgeon(s) and Role:     * Harpreet Huffman MD - Primary         Assistant Staff:       Surgical Staff:  Circ-1: Marylen Balm, RN  Radiology Technician: Blade Edge RT, R, CT  Scrub Tech-1: Linda Pathak  Event Time In   Incision Start 4021   Incision Close      Anesthesia: General   Estimated Blood Loss: 30cc  Specimens: * No specimens in log *   Findings: extruded HNP   Complications: none  Implants: * No implants in log *

## 2017-07-21 VITALS
HEIGHT: 69 IN | WEIGHT: 181.56 LBS | DIASTOLIC BLOOD PRESSURE: 89 MMHG | SYSTOLIC BLOOD PRESSURE: 154 MMHG | RESPIRATION RATE: 16 BRPM | BODY MASS INDEX: 26.89 KG/M2 | HEART RATE: 109 BPM | TEMPERATURE: 97.8 F | OXYGEN SATURATION: 99 %

## 2017-07-21 LAB — GLUCOSE BLD STRIP.AUTO-MCNC: 143 MG/DL (ref 65–100)

## 2017-07-21 PROCEDURE — 82962 GLUCOSE BLOOD TEST: CPT

## 2017-07-21 PROCEDURE — 74011250637 HC RX REV CODE- 250/637: Performed by: ORTHOPAEDIC SURGERY

## 2017-07-21 PROCEDURE — 74011250636 HC RX REV CODE- 250/636: Performed by: ORTHOPAEDIC SURGERY

## 2017-07-21 PROCEDURE — G8980 MOBILITY D/C STATUS: HCPCS

## 2017-07-21 PROCEDURE — G8979 MOBILITY GOAL STATUS: HCPCS

## 2017-07-21 PROCEDURE — 97116 GAIT TRAINING THERAPY: CPT

## 2017-07-21 PROCEDURE — 74011636637 HC RX REV CODE- 636/637: Performed by: ORTHOPAEDIC SURGERY

## 2017-07-21 PROCEDURE — 99218 HC RM OBSERVATION: CPT

## 2017-07-21 PROCEDURE — 97161 PT EVAL LOW COMPLEX 20 MIN: CPT

## 2017-07-21 PROCEDURE — G8978 MOBILITY CURRENT STATUS: HCPCS

## 2017-07-21 RX ADMIN — HYDROCODONE BITARTRATE AND ACETAMINOPHEN 1 TABLET: 10; 325 TABLET ORAL at 00:31

## 2017-07-21 RX ADMIN — METFORMIN HYDROCHLORIDE 1000 MG: 500 TABLET, FILM COATED ORAL at 08:30

## 2017-07-21 RX ADMIN — CEFAZOLIN 2 G: 1 INJECTION, POWDER, FOR SOLUTION INTRAMUSCULAR; INTRAVENOUS; PARENTERAL at 08:35

## 2017-07-21 RX ADMIN — GABAPENTIN 300 MG: 300 CAPSULE ORAL at 08:30

## 2017-07-21 RX ADMIN — FAMOTIDINE 20 MG: 20 TABLET ORAL at 08:29

## 2017-07-21 RX ADMIN — ATORVASTATIN CALCIUM 80 MG: 40 TABLET, FILM COATED ORAL at 08:30

## 2017-07-21 RX ADMIN — HYDROCODONE BITARTRATE AND ACETAMINOPHEN 1 TABLET: 10; 325 TABLET ORAL at 10:33

## 2017-07-21 RX ADMIN — INSULIN HUMAN 55 UNITS: 100 INJECTION, SUSPENSION SUBCUTANEOUS at 08:50

## 2017-07-21 RX ADMIN — CEFAZOLIN 2 G: 1 INJECTION, POWDER, FOR SOLUTION INTRAMUSCULAR; INTRAVENOUS; PARENTERAL at 00:26

## 2017-07-21 RX ADMIN — SODIUM CHLORIDE, SODIUM LACTATE, POTASSIUM CHLORIDE, AND CALCIUM CHLORIDE 100 ML/HR: 600; 310; 30; 20 INJECTION, SOLUTION INTRAVENOUS at 06:01

## 2017-07-21 RX ADMIN — HYDROCODONE BITARTRATE AND ACETAMINOPHEN 1 TABLET: 10; 325 TABLET ORAL at 06:02

## 2017-07-21 RX ADMIN — Medication 10 ML: at 06:04

## 2017-07-21 RX ADMIN — ONDANSETRON 4 MG: 2 INJECTION INTRAMUSCULAR; INTRAVENOUS at 10:33

## 2017-07-21 RX ADMIN — LISINOPRIL 2.5 MG: 5 TABLET ORAL at 08:29

## 2017-07-21 NOTE — DISCHARGE INSTRUCTIONS
English  Australian  Lumbar Discectomy: What to Expect at [address] is surgery to remove part or all of a bulging (herniated) disc in the spine. A bulging disc may press on the spinal cord or spinal nerves and cause leg pain and numbness. Your doctor made a 1- to 2-inch cut (incision) in the skin over the spine. He then used surgical tools through the incision to do the surgery. You can expect your back to feel stiff or sore after surgery. This should improve in the weeks after surgery. You may have relief from your symptoms right away, or you may get better over days or weeks. In the weeks after your surgery, it may be hard to sit or  one position for very long and you may need pain medicine. It may take 8 weeks or longer to get back to doing your usual activities. Your doctor may advise you to work with a physical therapist to strengthen the muscles around your spine and trunk. You will need to learn how to lift, twist, and bend so you do not put too much strain on your back. The pain or numbness you had in your legs before surgery should get better or go away completely. This care sheet gives you a general idea about how long it will take for you to recover. But each person recovers at a different pace. Follow the steps below to get better as quickly as possible. How can you care for yourself at home? Activity  · Rest when you feel tired. Getting enough sleep will help you recover. · Try to walk each day. Start by walking a little more than you did the day before. Bit by bit, increase the amount you walk. Walking is a gentle exercise and helps prevent pneumonia and constipation. Walking may also decrease your muscle soreness after surgery. · If advised by your doctor, you may need to avoid lifting anything that would cause excessive strain on your back.  This may include heavy grocery bags and milk containers, a heavy briefcase or backpack, cat litter or dog food bags, a child, or a vacuum . · Avoid strenuous activities, such as bicycle riding, jogging, weight lifting, or aerobic exercise, until your doctor says it is okay. · Ask your doctor when you can drive again. · Avoid riding in a car for more than 30 minutes at a time for 2 to 4 weeks after surgery. If you must ride in a car for a longer distance, stop often to walk and stretch your legs. · Try to change your position about every 30 minutes while you sit or stand. This will help decrease your back pain while you heal.  · Your time off from work depends on how quickly you feel better and on the type of work you do. If you work in an office, you likely can go back to work sooner than if you have a job where you are very active. Talk with your doctor about your work needs. · You may have sex as soon as you feel able, but avoid positions that put stress on your back or cause pain. Diet  · You can eat your normal diet. If your stomach is upset, try bland, low-fat foods like plain rice, broiled chicken, toast, and yogurt. · Drink plenty of fluids (unless your doctor tells you not to). · You may notice that your bowel movements are not regular right after your surgery. This is common. Try to avoid constipation and straining with bowel movements. You may want to take a fiber supplement every day. If you have not had a bowel movement after a couple of days, take a mild laxative. Medicines  · Take pain medicines exactly as directed. ¨ If the doctor gave you a prescription medicine for pain, take it as prescribed. ¨ If you are not taking a prescription pain medicine, ask your doctor if you can take an over-the-counter medicine. ¨ Do not take two or more pain medicines at the same time unless the doctor told you to. Many pain medicines have acetaminophen, which is Tylenol. Too much acetaminophen (Tylenol) can be harmful.   · If you think your pain medicine is making you sick to your stomach:  ¨ Take your medicine after meals (unless your doctor has told you not to). ¨ Ask your doctor for a different pain medicine. · If your doctor prescribed antibiotics, take them as directed. Do not stop taking them just because you feel better. You need to take the full course of antibiotics. Incision care  · If you have strips of tape on the cut (incision) the doctor made, leave the tape on for a week or until it falls off. · Gently rinse the area daily with warm, soapy water, and pat it dry. Make sure you understand how to care for your incision before you leave the hospital.  · Keep the area clean and dry. You may cover it with a gauze bandage if it weeps or rubs against clothing. Change the bandage every day. Exercise  · Do back exercises as instructed by your doctor. · Your doctor may recommend that you work with a physical therapist to improve the strength and flexibility of your back. Other instructions  · After your incision heals, about 5 to 7 days after surgery, you can use ice, a heating pad, a hot water bottle, or gentle massage on your back to reduce stiffness. Follow-up care is a key part of your treatment and safety. Be sure to make and go to all appointments, and call your doctor if you are having problems. Its also a good idea to know your test results and keep a list of the medicines you take. [employer] [phone]    When should you call for help? Call 911 anytime you think you may need emergency care. For example, call if:  · You pass out (lose consciousness). · You have sudden chest pain and shortness of breath, or you cough up blood. · You lose bladder or bowel control. · One or both legs suddenly feel weak or numb. Call your doctor now or seek immediate medical care if:  · You have pain that does not get better after you take pain medicine. · You have a headache that does not get better after you take medicine for it. · You have loose stitches, or your incision comes open.   · You have signs of infection, such as:  ¨ Increased pain, swelling, warmth, or redness. ¨ Red streaks leading from the incision. ¨ Pus draining from the incision. ¨ Swollen lymph nodes in your neck, armpits or groin. ¨ A fever. · You have blood or fluid draining from the incision. Watch closely for changes in your health, and be sure to contact your doctor if:  · You have new numbness or tingling in your legs. · You have new pain or weakness in your legs. · You do not have a bowel movement after taking a laxative. Where can you learn more? Go to [facility]. Enter K843 in the search box to learn more about \"Lumbar Microdiscectomy: What to Expect at Home. \"   © 8750-9137 [employer]. Care instructions adapted under license by [facility] [employer] (which disclaims liability or warranty for this information). This care instruction is for use with your licensed healthcare professional. If you have questions about a medical condition or this instruction, always ask your healthcare professional. [employer] any warranty or liability for your use of this information. DISCHARGE SUMMARY from Nurse    The following personal items are in your possession at time of discharge:    Dental Appliances: Partials, At home (upper & lower partials)  Visual Aid: Glasses        Jewelry: None  Clothing: Shirt, Pants, Footwear, Undergarments  Other Valuables: None             PATIENT INSTRUCTIONS:    After general anesthesia or intravenous sedation, for 24 hours or while taking prescription Narcotics:  · Limit your activities  · Do not drive and operate hazardous machinery  · Do not make important personal or business decisions  · Do  not drink alcoholic beverages  · If you have not urinated within 8 hours after discharge, please contact your surgeon on call.     Report the following to your surgeon:  · Excessive pain, swelling, redness or odor of or around the surgical area  · Temperature over 100.5  · Nausea and vomiting lasting longer than 4 hours or if unable to take medications  · Any signs of decreased circulation or nerve impairment to extremity: change in color, persistent  numbness, tingling, coldness or increase pain  · Any questions        What to do at Home:  Recommended activity: See surgical instructions, diet as tolerated. *  Please give a list of your current medications to your Primary Care Provider. *  Please update this list whenever your medications are discontinued, doses are      changed, or new medications (including over-the-counter products) are added. *  Please carry medication information at all times in case of emergency situations. These are general instructions for a healthy lifestyle:    No smoking/ No tobacco products/ Avoid exposure to second hand smoke    Surgeon General's Warning:  Quitting smoking now greatly reduces serious risk to your health. Obesity, smoking, and sedentary lifestyle greatly increases your risk for illness    A healthy diet, regular physical exercise & weight monitoring are important for maintaining a healthy lifestyle    You may be retaining fluid if you have a history of heart failure or if you experience any of the following symptoms:  Weight gain of 3 pounds or more overnight or 5 pounds in a week, increased swelling in our hands or feet or shortness of breath while lying flat in bed. Please call your doctor as soon as you notice any of these symptoms; do not wait until your next office visit. Recognize signs and symptoms of STROKE:    F-face looks uneven    A-arms unable to move or move unevenly    S-speech slurred or non-existent    T-time-call 911 as soon as signs and symptoms begin-DO NOT go       Back to bed or wait to see if you get better-TIME IS BRAIN. Warning Signs of HEART ATTACK     Call 911 if you have these symptoms:   Chest discomfort.  Most heart attacks involve discomfort in the center of the chest that lasts more than a few minutes, or that goes away and comes back. It can feel like uncomfortable pressure, squeezing, fullness, or pain.  Discomfort in other areas of the upper body. Symptoms can include pain or discomfort in one or both arms, the back, neck, jaw, or stomach.  Shortness of breath with or without chest discomfort.  Other signs may include breaking out in a cold sweat, nausea, or lightheadedness. Don't wait more than five minutes to call 911 - MINUTES MATTER! Fast action can save your life. Calling 911 is almost always the fastest way to get lifesaving treatment. Emergency Medical Services staff can begin treatment when they arrive -- up to an hour sooner than if someone gets to the hospital by car. The discharge information has been reviewed with the patient. The patient verbalized understanding. Discharge medications reviewed with the patient and appropriate educational materials and side effects teaching were provided.

## 2017-07-21 NOTE — PROGRESS NOTES
ORTHO PROGRESS NOTE    2017    Admit Date: 2017  Admit Diagnosis: Herniated nucleus pulposus of lumbosacral region [M51.27]  Post Op day: 1 Day Post-Op      Subjective:     Padma James is a patient who is now 1 Day Post-Op  and has no complaints. Leg pain resolved. Objective:     PT/OT: independent        Vital Signs:    Patient Vitals for the past 8 hrs:   BP Temp Pulse Resp SpO2   17 0554 113/72 98 °F (36.7 °C) 66 14 95 %   17 0018 97/55 97.9 °F (36.6 °C) 88 18 97 %     Temp (24hrs), Av.7 °F (36.5 °C), Min:97.4 °F (36.3 °C), Max:98 °F (36.7 °C)      LAB:    No results for input(s): HGB, WBC, PLT, HGBEXT, PLTEXT in the last 72 hours. I/O:      1901 -  0700  In: 1900 [P.O.:150; I.V.:1750]  Out: 650 [Urine:400]    Physical Exam:    Awake and in no acute distress. Mood and affect appropriate. Respirations unlabored and no evidence cyanosis. Calves nontender. Abdomen soft and nontender. Dressing clean/dry  No new neurologic deficit.     Assessment:      Patient Active Problem List   Diagnosis Code    Lumbar herniated disc M51.26       1 Day Post-Op STATUS POST Procedure(s):  LEFT L2-L3 HEMILAMINOTOMY      Plan:     Continue PT/OT/Rehab  Discontinue: IV  Consult: none  Anticipate discharge to: HOME after am PT       Signed By: Gisele Roman NP

## 2017-07-21 NOTE — PROGRESS NOTES
Problem: Patient Education: Go to Patient Education Activity  Goal: Patient/Family Education  LTG:  (1.)Mr. Radha Downs will move from supine to sit and sit to supine demonstrating logroll technique with INDEPENDENCE within 7 day(s). (2.)Mr. Radha Downs will transfer from bed to chair and chair to bed with MODIFIED INDEPENDENCE using the least restrictive device within 7 day(s). (3.)Mr. Radha Downs will ambulate with INDEPENDENCE for 1500+ feet within 7 day(s). ________________________________________________________________________________________________      PHYSICAL THERAPY: INITIAL ASSESSMENT, DAILY NOTE, TREATMENT DAY: DAY OF ASSESSMENT, AM 7/21/2017  OBSERVATION: Hospital Day: 2  Payor: Cristobal Rosales / Plan: [place] [employer] [address] / Product Type: PPO /      Spinal precautions     NAME/AGE/GENDER: [place] Tee is a 66 y.o. male       PRIMARY DIAGNOSIS: Herniated nucleus pulposus of lumbosacral region [M51.27] Lumbar herniated disc Lumbar herniated disc  Procedure(s) (LRB):  LEFT L2-L3 HEMILAMINOTOMY (Left)  1 Day Post-Op  ICD-10: Treatment Diagnosis:       · Generalized Muscle Weakness (M62.81)  · Other lack of cordination (R27.8)  · Difficulty in walking, Not elsewhere classified (R26.2)  · Low Back Pain (M54.5)   Precaution/Allergies:  Review of patient's allergies indicates no known allergies. ASSESSMENT:      Mr. Radha Downs is a 66year old male admitted with a lumbar herniated disc and is 1 day s/p L L2-L3 hemilaminectomy. Patient seen this AM for initial physical therapy evaluation: presents supine in bed, endorses 9/10 pain, and agreeable to therapy. Patient lives with wife in a single story residence with a ramp to enter. At baseline, patient is independent with ADL's and ambulates independently but reports trouble walking due to back and L hip/buttock pain. Reports one fall within the last year.  Today, B UE strength 4+/5, B LE strength decreased functionally, and sensation intact to light touch C4-T1 and L3-S1 B. Educated on spinal precautions and logroll technique. Patient performed scooting and rolling in bed with SBA and supine to sit with CGA-min assist. Patient presented with nausea/vomiting sitting EOB. Vitals checked at EOB: /89 and . Felt better after a couple mins of sitting. Performed sit to stand transfers with CGA-min assist and ambulation x500ft with SBA with no more complaints of nausea. Demonstrated slow gait pattern with increased trunk sway and decreased step length and clearance B. Mr. Bouchra Spears presents with decreased functional mobility and balance/gait status from baseline. Recommend continued skilled PT services to address stated deficits. Will follow and progress toward stated goals during acute stay. Mr. Bouchra Spears was discharged from our facility before further treatment could be provided in this setting. This section established at most recent assessment   PROBLEM LIST (Impairments causing functional limitations):  1. Decreased Strength  2. Decreased ADL/Functional Activities  3. Decreased Transfer Abilities  4. Decreased Ambulation Ability/Technique  5. Decreased Balance  6. Increased Pain  7. Decreased Activity Tolerance  8. Decreased Knowledge of Precautions    INTERVENTIONS PLANNED: (Benefits and precautions of physical therapy have been discussed with the patient.)  1. Balance Exercise  2. Bed Mobility  3. Gait Training  4. Neuromuscular Re-education/Strengthening  5. Therapeutic Activites  6. Therapeutic Exercise/Strengthening  7. Transfer Training  8. Group Therapy      TREATMENT PLAN: Frequency/Duration: twice daily for duration of hospital stay  Rehabilitation Potential For Stated Goals: GOOD      RECOMMENDED REHABILITATION/EQUIPMENT: (at time of discharge pending progress): Continue Skilled Therapy.                    HISTORY:   History of Present Injury/Illness (Reason for Referral):  Mr. Bouchra Spears is a 66year old male admitted with a lumbar herniated disc and is 1 day s/p L L2-L3 hemilaminectomy. Past Medical History/Comorbidities:   Mr. Jb Dickens  has a past medical history of Anxiety; Arthritis; Aspirin long-term use; Asthma; CAD (coronary artery disease); Chronic pain; Diabetes ([address] Former smoker; GERD (gastroesophageal reflux disease); Hypercholesteremia; Hypertension; Neurological disorder; and Stented coronary artery (12 yrs ago). He also has no past medical history of Adverse effect of anesthesia; Difficult intubation; Malignant hyperthermia due to anesthesia; Nausea & vomiting; or Pseudocholinesterase deficiency. Mr. Jb Dickens  has a past surgical history that includes heart catheterization (12 yrs ago) and rotator cuff repair (Right). Social History/Living Environment:   Home Environment: Private residence  Wheelchair Ramp: Yes  One/Two Story Residence: One story  Living Alone: No  Support Systems: Spouse/Significant Other/Partner, Family member(s)  Patient Expects to be Discharged to[de-identified] Private residence  Current DME Used/Available at Home: None  Prior Level of Function/Work/Activity:  Patient lives with wife in a single story residence with a ramp to enter. At baseline, patient is independent with ADL's and ambulates independently but reports trouble walking due to back and L hip/buttock pain. Reports one fall within the last year. Number of Personal Factors/Comorbidities that affect the Plan of Care: 3+: HIGH COMPLEXITY   EXAMINATION:   Most Recent Physical Functioning:   Gross Assessment:  AROM: Generally decreased, functional  Strength: Generally decreased, functional  Tone: Normal  Sensation: Intact               Posture:     Balance:  Sitting: Intact  Standing: Impaired  Standing - Static: Good  Standing - Dynamic : Fair (+) Bed Mobility:  Rolling: Stand-by asssistance  Supine to Sit: Minimum assistance  Scooting: Stand-by asssistance  Interventions: Verbal cues;Manual cues; Tactile cues  Wheelchair Mobility:     Transfers:  Sit to Stand: Contact guard assistance  Stand to Sit: Contact guard assistance  Bed to Chair: Contact guard assistance  Gait:     Base of Support: Widened  Speed/Samantha: Slow  Gait Abnormalities: Trunk sway increased;Decreased step clearance  Distance (ft): 500 Feet (ft)  Ambulation - Level of Assistance: Stand-by asssistance       Body Structures Involved:  1. Nerves  2. Bones  3. Joints  4. Muscles  5. Ligaments Body Functions Affected:  1. Sensory/Pain  2. Neuromusculoskeletal  3. Movement Related Activities and Participation Affected:  1. Mobility   Number of elements that affect the Plan of Care: 4+: HIGH COMPLEXITY   CLINICAL PRESENTATION:   Presentation: Stable and uncomplicated: LOW COMPLEXITY   CLINICAL DECISION MAKIN [facility] [id number] AM-PAC 6 Clicks   Basic Mobility Inpatient Short Form  How much difficulty does the patient currently have. .. Unable A Lot A Little None   1. Turning over in bed (including adjusting bedclothes, sheets and blankets)? [ ] 1   [ ] 2   [ ] 3   [X] 4   2. Sitting down on and standing up from a chair with arms ( e.g., wheelchair, bedside commode, etc.)   [ ] 1   [ ] 2   [X] 3   [ ] 4   3. Moving from lying on back to sitting on the side of the bed? [ ] 1   [ ] 2   [X] 3   [ ] 4   How much help from another person does the patient currently need. .. Total A Lot A Little None   4. Moving to and from a bed to a chair (including a wheelchair)? [ ] 1   [ ] 2   [X] 3   [ ] 4   5. Need to walk in hospital room? [ ] 1   [ ] 2   [X] 3   [ ] 4   6. Climbing 3-5 steps with a railing? [ ] 1   [X] 2   [ ] 3   [ ] 4   © , Trustees of [facility] [address] [address], under license to [employer]. All rights reserved    Score:  Initial: 18 Most Recent: X (Date: -- )     Interpretation of Tool:  Represents activities that are increasingly more difficult (i.e. Bed mobility, Transfers, Gait).        Score 24 23 22-20 19-15 14-10 9-7 6       Modifier CH CI CJ CK CL CM CN         · Mobility - Walking and Moving Around:               - CURRENT STATUS:    CK - 40%-59% impaired, limited or restricted               - GOAL STATUS:           CJ - 20%-39% impaired, limited or restricted               - D/C STATUS:                       CK - 40%-59% impaired, limited or restricted  Payor: [employer] / Plan: [address] / Product Type: PPO /       Medical Necessity:     · Patient demonstrates good rehab potential due to higher previous functional level. Reason for Services/Other Comments:  · Patient continues to require skilled intervention due to decreased functional mobility, balance/gait status, and activity tolerance. Use of outcome tool(s) and clinical judgement create a POC that gives a: Clear prediction of patient's progress: LOW COMPLEXITY                 TREATMENT:   (In addition to Assessment/Re-Assessment sessions the following treatments were rendered)   Pre-treatment Symptoms/Complaints:    Pain: Initial:   Pain Intensity 1: 9  Pain Location 1: Back  Pain Intervention(s) 1: Emotional support, Repositioned  Post Session:  Back pain better with mobility/ambulation      Initial Evaluation     Gait Training ( 10 Minutes):  Gait training to improve and/or restore physical functioning as related to mobility, strength and balance. Ambulated 500 Feet (ft) with Stand-by asssistance with  minimal   related to their dynamic standing balance to promote proper body alignment, promote proper body posture and promote proper body mechanics. Braces/Orthotics/Lines/Etc:   · IV  · O2 Device: Room air  Treatment/Session Assessment:    · Response to Treatment:  See above. · Interdisciplinary Collaboration:  · Physical Therapist  · Registered Nurse  · SDPT  · After treatment position/precautions:  · Up in chair  · Call light within reach  · Family at bedside  · Compliance with Program/Exercises: Will assess as treatment progresses.   · Recommendations/Intent for next treatment session: \"Next visit will focus on advancements to more challenging activities\".   Total Treatment Duration:  PT Patient Time In/Time Out  Time In: 0918  Time Out: 0954     Carlos Kapadia, CHRISTIANNE Wilkinson, PT, DPT

## 2017-07-21 NOTE — PROGRESS NOTES
Discharge instructions and prescriptions given and explained to pt. Pt verbalized understanding. Medication side effects sheet reviewed with pt. Pt to be discharged home. Pt wife requesting Zofran to be called into [facility] in [place], call placed to Lord Quirino NP she is calling in prescription.

## 2017-07-24 NOTE — OP NOTES
[address] 64   OPERATIVE REPORT       Name:  Louise Aldrich   MR#:  [id number]   :  1945   Account #:  [de-identified]   Date of Adm:  2017       DATE OF SURGERY: 2017    ATTENDING PHYSICIAN:  Lon Boon E. Ira Schwab, M.D.    ASSISTANT: Dr. Roxanne Collins, orthopedic surgeon. PREOPERATIVE DIAGNOSIS: Left-sided L2-3 extruded disk herniation   under the L3 nerve root causing stenosis and leg pain. POSTOPERATIVE DIAGNOSIS: Left-sided L2-3 extruded disk   herniation under the L3 nerve root causing stenosis and leg   pain. PROCEDURE: Left-sided L2-3 laminectomy and removal of extruded   disk herniation using the operating microscope for   magnification. ANESTHESIA: GETA. ESTIMATED BLOOD LOSS: 30 mL. FLUIDS: 800 mL of crystalloid. DRAINS: None. SPECIMENS: None. INDICATIONS: The patient is a 21-year-old gentleman who has had   some degree of long-standing come and go back pain with   claudication symptoms, but several weeks prior, he had developed   severe left leg radiating radicular pain that was intolerable. MRI scan showed that he had a herniated disk with fragment under   the nerve root. We tried nerve blocks, pain medication, and he   had no improvement, so he is brought for surgical treatment. PROCEDURE: The patient was brought to the operating room. After   administration of anesthesia, IV antibiotics and placement of   monitoring line, [facility]   with the sling. His back was prepped with Betadine and sterilely   draped. At this point, surgeon called a timeout. We confirmed   the procedure to be performed, the allergy history and the fact   that he had received preoperative IV antibiotics. In addition,   the marking on his back on the left side in the preoperative   holding was identified in surgery.  It should also be noted that   an assistant was medically necessary for this case because of   the neural retraction involved. We identified the L2-3 level by   palpation. We made a midline incision over this with the   scalpel, dissected down through the subcutaneous tissue with   electrocautery and then we incised on the left side of the   spinous processes with electrocautery down the lamina out to the   facet joint. There was a significant amount of venous bleeding   during this and  this and it took a while to really stop   this. We placed an angled curette in the interlaminar space,   took a lateral C-arm x-ray that showed we were at L2-3. We   placed our self-retaining retractor. We then used a bur to bur   down the lamina on the left side at L2 and L3. Then we elevated   the ligamentum flavum with angled curette, used a Kerrison punch   to remove the ligamentum flavum, undercut the facet joint and   the remaining lamina above and below. We identified the neural   structures. We identified the disk space and traced this down   laterally down from the disk space to the pedicle below and this   was underneath the nerve root compressing it. We identified the   herniated the disk at the pedicle level and it had a soft tissue   venous plexus covering over it which we opened into using a   Penfield #4 and then we used  ball-tip probes to free up   extruded disk fragment and then removed those with the   micropituitaries. There were multiple fragments. After we had   removed all them, there was a fairly large dead space where this   herniation had occupied, but the nerve root was free, as was the   thecal sac. There was significant venous bleeding that we could   not really find individual veins to cauterize, so we placed   FloSeal over this and cottonoids and eventually the bleeding   stopped, so at this point, we had completed the hemilaminectomy   removal of extruded disk material. The thecal sac and nerve root   were free. We were just ready to close the wound.  We thoroughly   irrigated the wound multiple times, suctioned it dry. We placed   more FloSeal over the decompression site and then we closed the   deep fascia over this with interrupted figure-of-eight stitches   of #1 Vicryl. Subcutaneous tissue was reapproximated with   interrupted stitches of 2-0 Vicryl and the skin was closed with   a running subcuticular stitch of 3-0 Vicryl. Steri-Strips, dry   sterile dressings were applied. The patient was then rolled   supine onto the recovery room bed, having tolerated the   procedure well.              Sugar Allison MD      SEL / [address] Rasmussen   D:  07/20/2017   08:46   T:  07/24/2017   16:03   Job #:  [id number]

## 2019-01-18 ENCOUNTER — APPOINTMENT (OUTPATIENT)
Dept: CT IMAGING | Age: 74
End: 2019-01-18
Attending: STUDENT IN AN ORGANIZED HEALTH CARE EDUCATION/TRAINING PROGRAM
Payer: COMMERCIAL

## 2019-01-18 ENCOUNTER — APPOINTMENT (OUTPATIENT)
Dept: GENERAL RADIOLOGY | Age: 74
End: 2019-01-18
Attending: STUDENT IN AN ORGANIZED HEALTH CARE EDUCATION/TRAINING PROGRAM
Payer: COMMERCIAL

## 2019-01-18 ENCOUNTER — HOSPITAL ENCOUNTER (EMERGENCY)
Age: 74
Discharge: HOME | End: 2019-01-18
Attending: EMERGENCY MEDICINE
Payer: COMMERCIAL

## 2019-01-18 VITALS
RESPIRATION RATE: 16 BRPM | TEMPERATURE: 98.1 F | HEIGHT: 68 IN | DIASTOLIC BLOOD PRESSURE: 83 MMHG | OXYGEN SATURATION: 99 % | SYSTOLIC BLOOD PRESSURE: 153 MMHG | WEIGHT: 187 LBS | BODY MASS INDEX: 28.34 KG/M2 | HEART RATE: 77 BPM

## 2019-01-18 DIAGNOSIS — S09.90XA CLOSED HEAD INJURY, INITIAL ENCOUNTER: Primary | ICD-10-CM

## 2019-01-18 DIAGNOSIS — S01.01XA LACERATION OF SCALP, INITIAL ENCOUNTER: ICD-10-CM

## 2019-01-18 DIAGNOSIS — S00.03XA HEMATOMA OF SCALP, INITIAL ENCOUNTER: ICD-10-CM

## 2019-01-18 LAB
ANION GAP SERPL CALC-SCNC: 7 MMOL/L (ref 7–16)
ATRIAL RATE: 71 BPM
BASOPHILS # BLD: 0 K/UL (ref 0–0.2)
BASOPHILS NFR BLD: 0 % (ref 0–2)
BUN SERPL-MCNC: 14 MG/DL (ref 8–23)
CALCIUM SERPL-MCNC: 8.9 MG/DL (ref 8.3–10.4)
CALCULATED P AXIS, ECG09: 76 DEGREES
CALCULATED R AXIS, ECG10: 86 DEGREES
CALCULATED T AXIS, ECG11: 76 DEGREES
CHLORIDE SERPL-SCNC: 103 MMOL/L (ref 98–107)
CO2 SERPL-SCNC: 28 MMOL/L (ref 21–32)
CREAT SERPL-MCNC: 1.09 MG/DL (ref 0.8–1.5)
DIAGNOSIS, 93000: NORMAL
DIFFERENTIAL METHOD BLD: ABNORMAL
EOSINOPHIL # BLD: 0.3 K/UL (ref 0–0.8)
EOSINOPHIL NFR BLD: 3 % (ref 0.5–7.8)
ERYTHROCYTE [DISTWIDTH] IN BLOOD BY AUTOMATED COUNT: 13.5 % (ref 11.9–14.6)
GLUCOSE SERPL-MCNC: 186 MG/DL (ref 65–100)
HCT VFR BLD AUTO: 39.7 % (ref 41.1–50.3)
HGB BLD-MCNC: 13 G/DL (ref 13.6–17.2)
IMM GRANULOCYTES # BLD AUTO: 0 K/UL (ref 0–0.5)
IMM GRANULOCYTES NFR BLD AUTO: 1 % (ref 0–5)
LYMPHOCYTES # BLD: 2.1 K/UL (ref 0.5–4.6)
LYMPHOCYTES NFR BLD: 25 % (ref 13–44)
MCH RBC QN AUTO: 29.9 PG (ref 26.1–32.9)
MCHC RBC AUTO-ENTMCNC: 32.7 G/DL (ref 31.4–35)
MCV RBC AUTO: 91.3 FL (ref 79.6–97.8)
MONOCYTES # BLD: 0.5 K/UL (ref 0.1–1.3)
MONOCYTES NFR BLD: 5 % (ref 4–12)
NEUTS SEG # BLD: 5.5 K/UL (ref 1.7–8.2)
NEUTS SEG NFR BLD: 65 % (ref 43–78)
NRBC # BLD: 0 K/UL (ref 0–0.2)
P-R INTERVAL, ECG05: 146 MS
PLATELET # BLD AUTO: 240 K/UL (ref 150–450)
PMV BLD AUTO: 9.3 FL (ref 9.4–12.3)
POTASSIUM SERPL-SCNC: 4.1 MMOL/L (ref 3.5–5.1)
Q-T INTERVAL, ECG07: 398 MS
QRS DURATION, ECG06: 102 MS
QTC CALCULATION (BEZET), ECG08: 432 MS
RBC # BLD AUTO: 4.35 M/UL (ref 4.23–5.6)
SODIUM SERPL-SCNC: 138 MMOL/L (ref 136–145)
TROPONIN I SERPL-MCNC: <0.02 NG/ML (ref 0.02–0.05)
VENTRICULAR RATE, ECG03: 71 BPM
WBC # BLD AUTO: 8.5 K/UL (ref 4.3–11.1)

## 2019-01-18 PROCEDURE — 73030 X-RAY EXAM OF SHOULDER: CPT

## 2019-01-18 PROCEDURE — 93005 ELECTROCARDIOGRAM TRACING: CPT | Performed by: EMERGENCY MEDICINE

## 2019-01-18 PROCEDURE — 90715 TDAP VACCINE 7 YRS/> IM: CPT | Performed by: EMERGENCY MEDICINE

## 2019-01-18 PROCEDURE — 74011250636 HC RX REV CODE- 250/636: Performed by: EMERGENCY MEDICINE

## 2019-01-18 PROCEDURE — 90471 IMMUNIZATION ADMIN: CPT | Performed by: EMERGENCY MEDICINE

## 2019-01-18 PROCEDURE — 70450 CT HEAD/BRAIN W/O DYE: CPT

## 2019-01-18 PROCEDURE — 99284 EMERGENCY DEPT VISIT MOD MDM: CPT | Performed by: EMERGENCY MEDICINE

## 2019-01-18 PROCEDURE — 84484 ASSAY OF TROPONIN QUANT: CPT

## 2019-01-18 PROCEDURE — 80048 BASIC METABOLIC PNL TOTAL CA: CPT

## 2019-01-18 PROCEDURE — 85025 COMPLETE CBC W/AUTO DIFF WBC: CPT

## 2019-01-18 RX ADMIN — TETANUS TOXOID, REDUCED DIPHTHERIA TOXOID AND ACELLULAR PERTUSSIS VACCINE, ADSORBED 0.5 ML: 5; 2.5; 8; 8; 2.5 SUSPENSION INTRAMUSCULAR at 18:24

## 2019-01-18 NOTE — ED TRIAGE NOTES
Jase Hopper at work onto concrete around [phone] this afternoon, struck head. Lac and moderate hematoma present on posterior skull. Patient was unconscious for unknown amt of time. Left shoulder pain present. Pt is alert and oriented x 4 in triage. Dr. Nathan Díaz to triage to evaluate, orders received.

## 2019-01-18 NOTE — ED PROVIDER NOTES
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 States that he slipped backwards hitting his head with positive loss of consciousness. Patient with large hematoma to the posterior aspect of scalp. Denies neck pain, back pain, vision disturbance, chest pain, shortness of breath, nausea, vomiting, difficulty walking, numbness, tingling, weakness, bowel or bladder incontinence, seizure-like activity, slurred speech, facial droop. According to family patient has slight memory impairment of incident surrounding his actual fall. Uncertain of tetanus up-to-date. Patient also complains of bilateral shoulder pain. The history is provided by the patient. No  was used. Head Laceration The incident occurred less than 1 hour ago. The laceration is located on the scalp. The laceration is 1 cm in size. Injury mechanism: Fall  Foreign body present: no. The pain is at a severity of 2/10. The pain is mild. The pain has been constant since onset. Pertinent negatives include no numbness, no tingling, no weakness, no loss of motion and no discoloration. It is unknown when the patient last had a tetanus shot. Past Medical History:  
Diagnosis Date  Anxiety  Arthritis  Aspirin long-term use   
 has 2 cardiac stents  Asthma   
 no inhalers  CAD (coronary artery disease) stented x 2  Chronic pain   
 back  Diabetes ([facility] [address]   
 insulin reliant. bs: 200's. pt was not sure of his insulin dose. stated he was taking 70 units because his bottle said \" 70/30\". med list states he is to take 55 units bid.  Former smoker  GERD (gastroesophageal reflux disease)  Hypercholesteremia  Hypertension  Neurological disorder  Stented coronary artery 12 yrs ago  
 x 2 Past Surgical History:  
Procedure Laterality Date  HX HEART CATHETERIZATION  12 yrs ago  
 stented x 2  
  HX ROTATOR CUFF REPAIR Right Family History:  
Problem Relation Age of Onset  Cancer Mother  Heart Disease Father Social History Socioeconomic History  Marital status:  Spouse name: Not on file  Number of children: Not on file  Years of education: Not on file  Highest education level: Not on file Social Needs  Financial resource strain: Not on file  Food insecurity - worry: Not on file  Food insecurity - inability: Not on file  Transportation needs - medical: Not on file  Transportation needs - non-medical: Not on file Occupational History  Not on file Tobacco Use  Smoking status: Former Smoker Packs/day: 1.50 Years: 15.00 Pack years: 22.50  Smokeless tobacco: Never Used  Tobacco comment: quit in his 28's Substance and Sexual Activity  Alcohol use: No  
 Drug use: No  
 Sexual activity: Not on file Other Topics Concern  Not on file Social History Narrative  Not on file ALLERGIES: Patient has no known allergies. Review of Systems Constitutional: Negative for chills, fatigue and fever. HENT: Negative for nosebleeds. Eyes: Negative for visual disturbance. Respiratory: Negative for cough and shortness of breath. Cardiovascular: Negative for chest pain and palpitations. Gastrointestinal: Negative for abdominal pain, nausea and vomiting. Genitourinary: Negative for dysuria and flank pain. Musculoskeletal: Positive for arthralgias. Negative for back pain, gait problem, myalgias, neck pain and neck stiffness. Skin: Positive for wound. Neurological: Positive for headaches. Negative for dizziness, tingling, weakness and numbness. Vitals:  
 01/18/19 1619 01/18/19 1639 BP: 141/81 151/79 Pulse: 78 Resp: 18 Temp: 98.4 °F (36.9 °C) SpO2: 98% Weight: 84.8 kg (187 lb) Height: 5' 8\" (1.727 m) Physical Exam  
 Constitutional: He is oriented to person, place, and time. He appears well-developed. Patient well-appearing and in no acute distress. HENT:  
Head: Normocephalic. MMM. Large hematoma present to posterior scalp. Small <0.5 cm superficial laceration noted to center or hematoma. No active bleeding or evidence of FB. No evidence of basilar skull fracture. Eyes: EOM are normal. Pupils are equal, round, and reactive to light. No nystagmus. Neck: Neck supple. No JVD present. FROM. No midline Cspine TTP. No step-off. Cardiovascular: Normal rate, regular rhythm and normal heart sounds. Radial pulses 2+ and equal bilaterally. Pulmonary/Chest:  
CTAB. Abdominal:  
Soft, NTND. No CVAT. Musculoskeletal: Normal range of motion. He exhibits no tenderness or deformity. Mild bilateral shoulder tenderness to palpation. No crepitus. Full range of motion of bilateral shoulders. Radial pulses 2+ and equal bilaterally. Strength 5 out of 5 bilaterally. Normal sensory exam.  No midline T-spine or L-spine tenderness to palpation. No step-off. No deformity. Neurological: He is alert and oriented to person, place, and time. No cranial nerve deficit or sensory deficit. Strength 5/5 throughout. Normal sensory exam.  No facial droop. No dysarthria. Normal gait. No saddle anesthesia. Skin: Skin is warm and dry. Psychiatric: He has a normal mood and affect. Nursing note and vitals reviewed. MDM Number of Diagnoses or Management Options Closed head injury, initial encounter: new and requires workup Hematoma of scalp, initial encounter: new and requires workup Laceration of scalp, initial encounter: new and requires workup Diagnosis management comments: Imaging with no acute findings. Pt states he does not want at CT of C-spine as he has no neck pain. Pt at baseline mental status. Neuro normal. 
Labs normal. ECG with no acute findings. Informed of possible concussion and need for prompt follow-up. Instructed that suture would need to be removed in 7 days. Pt given strict return precautions. Amount and/or Complexity of Data Reviewed Clinical lab tests: ordered and reviewed Tests in the radiology section of CPT®: ordered and reviewed Tests in the medicine section of CPT®: ordered and reviewed Review and summarize past medical records: yes Independent visualization of images, tracings, or specimens: yes Risk of Complications, Morbidity, and/or Mortality Presenting problems: moderate Diagnostic procedures: moderate Management options: moderate Patient Progress Patient progress: stable ED Course as of Jan 18 1835 Fri Jan 18, 2019 1812 CT head IMPRESSION: No CT evidence of acute intracranial abnormality. [DF] 1812 XR R shoulder IMPRESSION: Degenerative change without acute abnormality. [DF] 1813 XR L shoulder IMPRESSION: No acute findings. [DF] ED Course User Index 
[DF] Josue Ramirez MD  
 
 
EKG Date/Time: 1/19/2019 3:48 AM 
Performed by: Josue Ramirez MD 
Authorized by: Josue Ramirez MD  
 
ECG reviewed by ED Physician in the absence of a cardiologist: yes Rate:  
  ECG rate:  71 ECG rate assessment: normal   
Rhythm:  
  Rhythm: sinus rhythm QRS:  
  QRS axis:  Normal 
  QRS intervals:  Normal 
Conduction:  
  Conduction: normal   
ST segments: ST segments:  Normal 
T waves:  
  T waves: normal   
 
 
 
Results Include: 
 
Recent Results (from the past 24 hour(s)) CBC WITH AUTOMATED DIFF Collection Time: 01/18/19  6:23 PM  
Result Value Ref Range WBC 8.5 4.3 - 11.1 K/uL  
 RBC 4.35 4.23 - 5.6 M/uL  
 HGB 13.0 (L) 13.6 - 17.2 g/dL HCT 39.7 (L) 41.1 - 50.3 % MCV 91.3 79.6 - 97.8 FL  
 MCH 29.9 26.1 - 32.9 PG  
 MCHC 32.7 31.4 - 35.0 g/dL  
 RDW 13.5 11.9 - 14.6 % PLATELET 445 400 - 505 K/uL  MPV 9.3 (L) 9.4 - 12.3 FL  
 ABSOLUTE NRBC 0.00 0.0 - 0.2 K/uL  
 DF AUTOMATED NEUTROPHILS 65 43 - 78 % LYMPHOCYTES 25 13 - 44 % MONOCYTES 5 4.0 - 12.0 % EOSINOPHILS 3 0.5 - 7.8 % BASOPHILS 0 0.0 - 2.0 % IMMATURE GRANULOCYTES 1 0.0 - 5.0 %  
 ABS. NEUTROPHILS 5.5 1.7 - 8.2 K/UL  
 ABS. LYMPHOCYTES 2.1 0.5 - 4.6 K/UL  
 ABS. MONOCYTES 0.5 0.1 - 1.3 K/UL  
 ABS. EOSINOPHILS 0.3 0.0 - 0.8 K/UL  
 ABS. BASOPHILS 0.0 0.0 - 0.2 K/UL  
 ABS. IMM. GRANS. 0.0 0.0 - 0.5 K/UL METABOLIC PANEL, BASIC Collection Time: 01/18/19  6:23 PM  
Result Value Ref Range Sodium 138 136 - 145 mmol/L Potassium 4.1 3.5 - 5.1 mmol/L Chloride 103 98 - 107 mmol/L  
 CO2 28 21 - 32 mmol/L Anion gap 7 7 - 16 mmol/L Glucose 186 (H) 65 - 100 mg/dL BUN 14 8 - 23 MG/DL Creatinine 1.09 0.8 - 1.5 MG/DL  
 GFR est AA >60 >60 ml/min/1.73m2 GFR est non-AA >60 >60 ml/min/1.73m2 Calcium 8.9 8.3 - 10.4 MG/DL  
TROPONIN I Collection Time: 01/18/19  6:23 PM  
Result Value Ref Range Troponin-I, Qt. <0.02 (L) 0.02 - 0.05 NG/ML  
EKG, 12 LEAD, INITIAL Collection Time: 01/18/19  6:39 PM  
Result Value Ref Range Ventricular Rate 71 BPM  
 Atrial Rate 71 BPM  
 P-R Interval 146 ms  
 QRS Duration 102 ms Q-T Interval 398 ms QTC Calculation (Bezet) 432 ms Calculated P Axis 76 degrees Calculated R Axis 86 degrees Calculated T Axis 76 degrees Diagnosis Sinus rhythm with Premature ventricular complexes or Fusion complexes Otherwise normal ECG When compared with ECG of 12-JUL-2017 11:43, Fusion complexes are now Present Premature ventricular complexes are now Present Confirmed by ST MAYDA RG MD (), BERNADINE SCHULTZ ([id number]) on 1/18/2019 8:56:33 PM 
  
 
 
  
Jp Nogueira., MD; 1/18/2019 @6:38 PM Voice dictation software was used during the making of this note. This software is not perfect and grammatical and other typographical errors may be present. This note has not been proofread for errors. ===================================================================

## 2019-01-19 NOTE — ED NOTES
I have reviewed discharge instructions with the patient. The patient verbalized understanding. Patient left ED via Discharge Method: ambulatory to Home with spouse. Opportunity for questions and clarification provided. Patient given 0 scripts. To continue your aftercare when you leave the hospital, you may receive an automated call from our care team to check in on how you are doing. This is a free service and part of our promise to provide the best care and service to meet your aftercare needs.  If you have questions, or wish to unsubscribe from this service please call [phone]. Thank you for Choosing our [facility].

## 2019-01-19 NOTE — DISCHARGE INSTRUCTIONS
Schedule close follow-up with primary care physician. Will need suture removed in around 7 days. Return to ED if worsening headache, confusion, projectile vomiting, dizziness, focal weakness, numbness, etc.    Concussion: Care Instructions  Your Care Instructions    A concussion is a kind of injury to the brain. It happens when the head receives a hard blow. The impact can jar or shake the brain against the skull. This interrupts the brain's normal activities. Although you may have cuts or bruises on your head or face, you may have no other visible signs of a brain injury. In most cases, damage to the brain from a concussion can't be seen in tests such as a CT or MRI scan. For a few weeks, you may have low energy, dizziness, trouble sleeping, a headache, ringing in your ears, or nausea. You may also feel anxious, grumpy, or depressed. You may have problems with memory and concentration. These symptoms are common after a concussion. They should slowly improve over time. Sometimes this takes weeks or even months. Someone who lives with you should know how to care for you. Please share this and all information with a caregiver who will be available to help if needed. Follow-up care is a key part of your treatment and safety. Be sure to make and go to all appointments, and call your doctor if you are having problems. It's also a good idea to know your test results and keep a list of the medicines you take. How can you care for yourself at home? Pain control  · Put ice or a cold pack on the part of your head that hurts for 10 to 20 minutes at a time. Put a thin cloth between the ice and your skin. · Be safe with medicines. Read and follow all instructions on the label. ? If the doctor gave you a prescription medicine for pain, take it as prescribed. ? If you are not taking a prescription pain medicine, ask your doctor if you can take an over-the-counter medicine. Recovery  · Follow your doctor's instructions. He or she will tell you if you need someone to watch you closely for the next 24 hours or longer. · Rest is the best way to recover from a concussion. You need to rest your body and your brain:  ? Get plenty of sleep at night. And take rest breaks during the day. ? Avoid activities that take a lot of physical or mental work. This includes housework, exercise, schoolwork, video games, text messaging, and using the computer. ? You may need to change your school or work schedule while you recover. ? Return to your normal activities slowly. Do not try to do too much at once. · Do not drink alcohol or use illegal drugs. Alcohol and illegal drugs can slow your recovery. And they can increase your risk of a second brain injury. · Avoid activities that could lead to another concussion. Follow your doctor's instructions for a gradual return to activity and sports. · Ask your doctor when it's okay for you to drive a car, ride a bike, or operate machinery. How should you return to activity? Your return to activity can begin after 1 to 2 days of physical and mental rest. After resting, you can gradually increase your activity as long as it does not cause new symptoms or worsen your symptoms. Doctors and concussion specialists suggest steps to follow for returning to sports after a concussion. Use these steps as a guide. You should slowly progress through the following levels of activity:  1. Limited activity. You can take part in daily activities as long as the activity doesn't increase your symptoms or cause new symptoms. 2. Light aerobic activity. This can include walking, swimming, or other exercise at less than 70% of maximum heart rate. No resistance training is included in this step. 3. Sport-specific exercise. This includes running drills or skating drills (depending on the sport), but no head impact. 4. Noncontact training drills. This includes more complex training drills such as passing.  The athlete may also begin light resistance training. 5. Full-contact practice. The athlete can participate in normal training. 6. Return to normal game play. This is the final step and allows the athlete to join in normal game play. Watch and keep track of your progress. It should take at least 6 days for you to go from light activity to normal game play. Make sure that you can stay at each new level of activity for at least 24 hours without symptoms, or as long as your doctor says, before doing more. If one or more symptoms come back, return to a lower level of activity for at least 24 hours. Don't move on until all symptoms are gone. When should you call for help? Call 911 anytime you think you may need emergency care. For example, call if:    · You have a seizure.     · You passed out (lost consciousness).     · You are confused or can't stay awake.    Call your doctor now or seek immediate medical care if:    · You have new or worse vomiting.     · You feel less alert.     · You have new weakness or numbness in any part of your body.    Watch closely for changes in your health, and be sure to contact your doctor if:    · You do not get better as expected.     · You have new symptoms, such as headaches, trouble concentrating, or changes in mood. Where can you learn more? Go to http://ciara-norm.info/. Enter X607 in the search box to learn more about \"Concussion: Care Instructions. \"  Current as of: Debbie 3, 2018  Content Version: 11.9  © 9823-7139 [employer]. Care instructions adapted under license by [employer] (which disclaims liability or warranty for this information).  If you have questions about a medical condition or this instruction, always ask your healthcare professional. [place] [address] any warranty or liability for your use of this information.             Cuts: Care Instructions  Your Care Instructions  A cut can happen anywhere on your body.  Stitches, staples, skin adhesives, or pieces of tape called Steri-Strips are sometimes used to keep the edges of a cut together and help it heal. Steri-Strips can be used by themselves or with stitches or staples. Sometimes cuts are left open. If the cut went deep and through the skin, the doctor may have closed the cut in two layers. A deeper layer of stitches brings the deep part of the cut together. These stitches will dissolve and don't need to be removed. The upper layer closure, which could be stitches, staples, Steri-Strips, or adhesive, is what you see on the cut. A cut is often covered by a bandage. The doctor has checked you carefully, but problems can develop later. If you notice any problems or new symptoms, get medical treatment right away. Follow-up care is a key part of your treatment and safety. Be sure to make and go to all appointments, and call your doctor if you are having problems. It's also a good idea to know your test results and keep a list of the medicines you take. How can you care for yourself at home? If a cut is open or closed  · Prop up the sore area on a pillow anytime you sit or lie down during the next 3 days. Try to keep it above the level of your heart. This will help reduce swelling. · Keep the cut dry for the first 24 to 48 hours. After this, you can shower if your doctor okays it. Pat the cut dry. · Don't soak the cut, such as in a bathtub. Your doctor will tell you when it's safe to get the cut wet. · After the first 24 to 48 hours, clean the cut with soap and water 2 times a day unless your doctor gives you different instructions. ? Don't use hydrogen peroxide or alcohol, which can slow healing. ? You may cover the cut with a thin layer of petroleum jelly and a nonstick bandage. ? If the doctor put a bandage over the cut, put on a new bandage after cleaning the cut or if the bandage gets wet or dirty.   · Avoid any activity that could cause your cut to reopen. · Be safe with medicines. Read and follow all instructions on the label. ? If the doctor gave you a prescription medicine for pain, take it as prescribed. ? If you are not taking a prescription pain medicine, ask your doctor if you can take an over-the-counter medicine. If the cut is closed with stitches, staples, or Steri-Strips  · Follow the above instructions for open or closed cuts. · Do not remove the stitches or staples on your own. Your doctor will tell you when to come back to have the stitches or staples removed. · Leave Steri-Strips on until they fall off. If the cut is closed with a skin adhesive  · Follow the above instructions for open or closed cuts. · Leave the skin adhesive on your skin until it falls off on its own. This may take 5 to 10 days. · Do not scratch, rub, or pick at the adhesive. · Do not put the sticky part of a bandage directly on the adhesive. · Do not put any kind of ointment, cream, or lotion over the area. This can make the adhesive fall off too soon. Do not use hydrogen peroxide or alcohol, which can slow healing. When should you call for help? Call your doctor now or seek immediate medical care if:    · You have new pain, or your pain gets worse.     · The skin near the cut is cold or pale or changes color.     · You have tingling, weakness, or numbness near the cut.     · The cut starts to bleed, and blood soaks through the bandage. Oozing small amounts of blood is normal.     · You have trouble moving the area near the cut.     · You have symptoms of infection, such as:  ? Increased pain, swelling, warmth, or redness around the cut.  ? Red streaks leading from the cut.  ? Pus draining from the cut.  ? A fever.    Watch closely for changes in your health, and be sure to contact your doctor if:    · The cut reopens.     · You do not get better as expected. Where can you learn more? Go to http://ciara-norm.info/.   Enter M735 in the search box to learn more about \"Cuts: Care Instructions. \"  Current as of: September 23, 2018  Content Version: 11.9  © 7689-7955 [employer]. Care instructions adapted under license by [employer] (which disclaims liability or warranty for this information). If you have questions about a medical condition or this instruction, always ask your healthcare professional. [address] any warranty or liability for your use of this information. Patient Education        Head Injury: Care Instructions  Your Care Instructions    Most injuries to the head are minor. Bumps, cuts, and scrapes on the head and face usually heal well and can be treated the same as injuries to other parts of the body. Although it's rare, once in a while a more serious problem shows up after you are home. So it's good to be on the lookout for symptoms for a day or two. Follow-up care is a key part of your treatment and safety. Be sure to make and go to all appointments, and call your doctor if you are having problems. It's also a good idea to know your test results and keep a list of the medicines you take. How can you care for yourself at home? · Follow your doctor's instructions. He or she will tell you if you need someone to watch you closely for the next 24 hours or longer. · Take it easy for the next few days or more if you are not feeling well. · Ask your doctor when it's okay for you to go back to activities like driving a car, riding a bike, or operating machinery. When should you call for help? Call 911 anytime you think you may need emergency care.  For example, call if:    · You have a seizure.     · You passed out (lost consciousness).     · You are confused or can't stay awake.    Call your doctor now or seek immediate medical care if:    · You have new or worse vomiting.     · You feel less alert.     · You have new weakness or numbness in any part of your body.    Watch closely for changes in your health, and be sure to contact your doctor if:    · You do not get better as expected.     · You have new symptoms, such as headaches, trouble concentrating, or changes in mood. Where can you learn more? Go to http://ciara-norm.info/. Enter N330 in the search box to learn more about \"Head Injury: Care Instructions. \"  Current as of: Debbie 3, 2018  Content Version: 11.9  © 7179-8136 [facility], [place]. Care instructions adapted under license by [employer] (which disclaims liability or warranty for this information). If you have questions about a medical condition or this instruction, always ask your healthcare professional. [address] any warranty or liability for your use of this information.

## 2021-07-21 PROBLEM — E11.9 CONTROLLED TYPE 2 DIABETES MELLITUS WITHOUT COMPLICATION, WITH LONG-TERM CURRENT USE OF INSULIN (HCC): Status: ACTIVE | Noted: 2021-07-21

## 2021-07-21 PROBLEM — M54.9 CHRONIC BILATERAL BACK PAIN: Status: ACTIVE | Noted: 2021-07-21

## 2021-07-21 PROBLEM — R07.9 CHEST PAIN: Status: ACTIVE | Noted: 2021-07-21

## 2021-07-21 PROBLEM — Z79.4 CONTROLLED TYPE 2 DIABETES MELLITUS WITHOUT COMPLICATION, WITH LONG-TERM CURRENT USE OF INSULIN (HCC): Status: ACTIVE | Noted: 2021-07-21

## 2021-07-21 PROBLEM — I25.10 CORONARY ARTERY DISEASE INVOLVING NATIVE CORONARY ARTERY OF NATIVE HEART: Status: ACTIVE | Noted: 2021-07-21

## 2021-07-21 PROBLEM — E78.5 HYPERLIPIDEMIA: Status: ACTIVE | Noted: 2021-07-21

## 2021-07-21 PROBLEM — G89.29 CHRONIC BILATERAL BACK PAIN: Status: ACTIVE | Noted: 2021-07-21

## 2021-07-21 PROBLEM — I10 ESSENTIAL HYPERTENSION: Status: ACTIVE | Noted: 2021-07-21

## 2022-03-18 PROBLEM — I25.10 CORONARY ARTERY DISEASE INVOLVING NATIVE CORONARY ARTERY OF NATIVE HEART: Status: ACTIVE | Noted: 2021-07-21

## 2022-03-19 PROBLEM — E78.5 HYPERLIPIDEMIA: Status: ACTIVE | Noted: 2021-07-21

## 2022-03-19 PROBLEM — E11.9 CONTROLLED TYPE 2 DIABETES MELLITUS WITHOUT COMPLICATION, WITH LONG-TERM CURRENT USE OF INSULIN (HCC): Status: ACTIVE | Noted: 2021-07-21

## 2022-03-19 PROBLEM — G89.29 CHRONIC BILATERAL BACK PAIN: Status: ACTIVE | Noted: 2021-07-21

## 2022-03-19 PROBLEM — M54.9 CHRONIC BILATERAL BACK PAIN: Status: ACTIVE | Noted: 2021-07-21

## 2022-03-19 PROBLEM — I10 ESSENTIAL HYPERTENSION: Status: ACTIVE | Noted: 2021-07-21

## 2022-03-19 PROBLEM — Z79.4 CONTROLLED TYPE 2 DIABETES MELLITUS WITHOUT COMPLICATION, WITH LONG-TERM CURRENT USE OF INSULIN (HCC): Status: ACTIVE | Noted: 2021-07-21

## 2022-03-19 PROBLEM — M51.26 LUMBAR HERNIATED DISC: Status: ACTIVE | Noted: 2017-07-20

## 2022-03-19 PROBLEM — R07.9 CHEST PAIN: Status: ACTIVE | Noted: 2021-07-21

## 2024-04-09 ENCOUNTER — EVALUATION (OUTPATIENT)
Age: 79
End: 2024-04-09

## 2024-04-09 ENCOUNTER — OFFICE VISIT (OUTPATIENT)
Dept: ORTHOPEDIC SURGERY | Age: 79
End: 2024-04-09
Payer: MEDICARE

## 2024-04-09 DIAGNOSIS — S62.619A CLOSED AVULSION FRACTURE OF PROXIMAL PHALANX OF FINGER, INITIAL ENCOUNTER: ICD-10-CM

## 2024-04-09 DIAGNOSIS — M25.241 JOINT LAXITY OF RIGHT HAND: ICD-10-CM

## 2024-04-09 DIAGNOSIS — M79.644 FINGER PAIN, RIGHT: Primary | ICD-10-CM

## 2024-04-09 DIAGNOSIS — S62.609A CLOSED FRACTURE DISLOCATION OF PROXIMAL INTERPHALANGEAL (PIP) JOINT OF FINGER, INITIAL ENCOUNTER: ICD-10-CM

## 2024-04-09 DIAGNOSIS — M79.644 PAIN OF FINGER OF RIGHT HAND: Primary | ICD-10-CM

## 2024-04-09 PROCEDURE — 99203 OFFICE O/P NEW LOW 30 MIN: CPT | Performed by: ORTHOPAEDIC SURGERY

## 2024-04-09 PROCEDURE — 1123F ACP DISCUSS/DSCN MKR DOCD: CPT | Performed by: ORTHOPAEDIC SURGERY

## 2024-04-09 PROCEDURE — 4004F PT TOBACCO SCREEN RCVD TLK: CPT | Performed by: ORTHOPAEDIC SURGERY

## 2024-04-09 PROCEDURE — G8421 BMI NOT CALCULATED: HCPCS | Performed by: ORTHOPAEDIC SURGERY

## 2024-04-09 PROCEDURE — G8427 DOCREV CUR MEDS BY ELIG CLIN: HCPCS | Performed by: ORTHOPAEDIC SURGERY

## 2024-04-09 NOTE — PROGRESS NOTES
[facility]  [address] [place] [id number]  Dept: [phone]   Occupational Therapy Orthosis Note     Referring MD: Friend, Kelley MICHELLE MD  Date: 4/9/2024  Diagnosis:    Diagnosis Orders   1. Pain of finger of right hand        2. Closed avulsion fracture of proximal phalanx of finger, initial encounter        3. Joint laxity of right hand           Therapy Precautions: Tendon precautions   Avulsion fracture central slip R SF    Payor: Payor: [facility] /  /  /  B[facility] pattern: [facility]- total time  Total Timed Codes:  60 min, Total Treatment Time: 60 min  Modifier needed: No  Episode visit count:  1     PMH:   Affected Extremity: right    Date of Injury: 4/5/24    Date of Surgery:  N/A    Mechanism of Injury:  Fell and tripped, sustained R SF PIP dislocation which he reduced himself,  sustained avulsion fracture central slip R SF, Boutonniere deformity    Wound/Pin/Incision:  N/A    ORTHOSIS ISSUED:   : FO (Finger orthosis) C/F (custom fabricated) without joints, include soft interface, straps, includes fitting and adjustment.     R SF PIP ext circumferential splint with dorsal DIP component, pt to remove strap over DIP for DIP arom with circumferential PIP Ext splint on,     TREATMENT PROVIDED:   Patient instructed in purpose, care, and precaution of orthosis wearing, Patient instructed in wearing schedule, Patient instructed in HEP, and full time splint wear  Patient also instructed in DIP flexion (with PIP ext splint on) and MP Flex/Ext with PIP ext splint on    GIDEEN # [id number]      WEAR SCHEDULE:   At all times, can remove strap that keeps DIP extended 5 x daily for DIP AROM with PIP EXT in splint    CARE OF ORTHOSIS AND PRECAUTIONS REVIEWED:   The orthosis can be cleaned with soap and water, rubbing alcohol, or a mild cleanser  Keep away from any direct source of heat, it will melt and/or lose it's shape  Keep away from dogs and/or pets that will chew the

## 2024-04-10 ENCOUNTER — TELEPHONE (OUTPATIENT)
Dept: ORTHOPEDIC SURGERY | Age: 79
End: 2024-04-10

## 2024-04-10 NOTE — PROGRESS NOTES
Orthopaedic Hand Clinic Note    Name: Steve Chaney  YOB: 1945  Gender: male  MRN: [id number]      CC: Patient referred for evaluation of upper extremity pain    HPI: Steve Chaney is a 78 y.o. male with a chief complaint of injury to the right small finger which occurred as a result of a fall. He says the finger was deformed. He pulled on it to straighten it. He has been using a splint .      ROS/Meds/PSH/PMH/FH/SH: I personally reviewed the patients standard intake form.  Pertinents are discussed in the HPI    Physical Examination:    Musculoskeletal Exam:  Examination on the right upper extremity demonstrates cap refill < 5 seconds in all fingers, skin is intact. There is tenderness, swelling and ecchymosis at the small finger. He is able to flex the PIP and DIP joint. There is a mild extension lag at the PIP joint. Zach's test is difficulty to perform due to pain and swelling.    Imaging / Electrodiagnostic Tests:     Finger XR: AP, Lateral, Oblique views     Clinical Indication:  1. Finger pain, right    2. Closed fracture dislocation of proximal interphalangeal (PIP) joint of finger, initial encounter           Report: AP, lateral, oblique x-ray of the right small finger demonstrates the PIP joint is concentrically reduced. There is an avulsed fragment at the dorsal aspect of the PIP concerning for a central slip avulsion    Impression: as above     Kelley Todd MD         Assessment:     ICD-10-CM    1. Finger pain, right  M79.644 XR FINGER RIGHT (MIN 2 VIEWS)     Ambulatory referral to Occupational Therapy      2. Closed fracture dislocation of proximal interphalangeal (PIP) joint of finger, initial encounter  S62.609A           Plan:   We discussed the diagnosis and different treatment options. We discussed observation, therapy, antiinflammatory medications and other pertinent treatment modalities.    After discussing in detail the patient elects to proceed with referral to OT for

## 2024-04-10 NOTE — TELEPHONE ENCOUNTER
I left a  for patient yesterday stating Dr. Todd wanted to see him back in 2 weeks and that we could see him at the [facility] office since that's closer to his home.  I advised them to please call me back so I can get this scheduled.

## 2024-04-16 ENCOUNTER — EVALUATION (OUTPATIENT)
Age: 79
End: 2024-04-16
Payer: OTHER GOVERNMENT

## 2024-04-16 DIAGNOSIS — M25.241 JOINT LAXITY OF RIGHT HAND: ICD-10-CM

## 2024-04-16 DIAGNOSIS — M79.644 PAIN OF FINGER OF RIGHT HAND: Primary | ICD-10-CM

## 2024-04-16 DIAGNOSIS — M79.644 FINGER PAIN, RIGHT: ICD-10-CM

## 2024-04-16 DIAGNOSIS — M25.441 EFFUSION OF JOINT OF RIGHT HAND: ICD-10-CM

## 2024-04-16 PROCEDURE — 97763 ORTHC/PROSTC MGMT SBSQ ENC: CPT | Performed by: OCCUPATIONAL THERAPIST

## 2024-04-16 PROCEDURE — 97110 THERAPEUTIC EXERCISES: CPT | Performed by: OCCUPATIONAL THERAPIST

## 2024-04-16 PROCEDURE — 97165 OT EVAL LOW COMPLEX 30 MIN: CPT | Performed by: OCCUPATIONAL THERAPIST

## 2024-04-16 NOTE — PROGRESS NOTES
minutes: subsequent encounter for modifications, fitting adjustments, and additional training.  Rewarmed and completely remolded orthosis due to decreasing edema to ensure proper fit/increase resting PIP extension to full.  Applied new velcro and made new straps.       Therapeutic exercise (87612) x 10 min:  Home Exercise Program development and Education: see below.  Patient I with program following instruction and performance  Use of heat and ice as needed for pain and inflammation reduction. Precautions reviewed.  OT POC and rationale, education for surgical precautions and pain management, edema management  DIP arom in orthosis (remove distal strap and support PIP in extension in circumferential PIP EXT orthosis)    CLINICAL DECISION MAKING/ASSESSMENT     Performance Deficits  Physical: Mobility and Strength  Cognitive: No deficiencies noted  Psychosocial: No deficiencies noted  Rehab potential: good    The patient presents today 1 wks status post PIP ext splinting for boutonniere deformity with decreased edema and need to have orthosis remolded/refurbished to ensure full PIP ext and proper fit.   .  The patient presents with decreasing edema, PIP pain, DIP stiffness (is performing in PIP Ext splint) . These deficits appear to be secondary to injury/precautions .  Based on these subjective and objective findings, the patient is perceived as currently having a primary functional deficit of  65% dysfunction.     After a brief chart/PMH and OT profile review, evaluation requiring minimal  assistance/modifications and simple patient and data analysis, I have determined the patient exhibits several (1-3) performance deficits. Comorbidities do not affect the patient's occupational performance. The following performance deficits (including but not limited to physical, cognitive and/or psychosocial components) result in activity limitations and participation restrictions: Mobility and Strength    The patient would

## 2024-04-29 ENCOUNTER — TREATMENT (OUTPATIENT)
Age: 79
End: 2024-04-29
Payer: OTHER GOVERNMENT

## 2024-04-29 DIAGNOSIS — M25.241 JOINT LAXITY OF RIGHT HAND: Primary | ICD-10-CM

## 2024-04-29 DIAGNOSIS — M25.441 EFFUSION OF JOINT OF RIGHT HAND: ICD-10-CM

## 2024-04-29 DIAGNOSIS — M79.644 PAIN OF FINGER OF RIGHT HAND: ICD-10-CM

## 2024-04-29 PROCEDURE — 97110 THERAPEUTIC EXERCISES: CPT | Performed by: OCCUPATIONAL THERAPIST

## 2024-04-29 NOTE — PROGRESS NOTES
[facility]  [address] [place] [id number]  Dept: [phone]      Occupational Therapy Initial Assessment     Referring MD: Friend, Kelley MICHELLE MD    Diagnosis:     ICD-10-CM    1. Joint laxity of right hand  M25.241       2. Pain of finger of right hand  M79.644       3. Effusion of joint of right hand  M25.441              Surgery/Medical Dx: Date  DOI  4/5/24  Avulsion fracture central slip R SF, Boutonniere deformity       Therapy precautions: Tendon precautions    History of injury/onset : Fell and tripped on 4/5/ 24, sustained R SF PIP dislocation which he reduced himself,  sustained avulsion fracture central slip R SF, Boutonniere deformity      Payor: Payor: [employer] BROWN /  /  /  Billing pattern: Government- total time   Total Direct Treatment Time:  40 min  Total In Office Time: 55 min  Modifier needed: No  Episode visit count:  3     Preferred Name: Steve    PERTINENT MEDICAL HISTORY     PMHX & Meds:   Past Medical History:   Diagnosis Date    Anxiety     Arthritis     Aspirin long-term use     has 2 cardiac stents    Asthma     no inhalers    CAD (coronary artery disease)     stented x 2    Chronic pain     back    Diabetes (HCC)     insulin reliant. bs: 200's. pt was not sure of his insulin dose. stated he was taking 70 units because his bottle said \" 70/30\". med list states he is to take 55 units bid.     Former smoker     GERD (gastroesophageal reflux disease)     Hypercholesteremia     Hypertension     Neurological disorder     Stented coronary artery 12 yrs ago    x 2   ,   Past Surgical History:   Procedure Laterality Date    CARDIAC CATHETERIZATION  12 yrs ago    stented x 2    ROTATOR CUFF REPAIR Right       Medications. : Reviewed in chart  Allergies: No Known Allergies     SUBJECTIVE       Patient and wife report splint has been on full time, has kept it dry, fitting well and performing DIP flexion in the PIP Ext splint as directed     OBJECTIVE     Functional

## 2024-05-07 ENCOUNTER — OFFICE VISIT (OUTPATIENT)
Age: 79
End: 2024-05-07
Payer: OTHER GOVERNMENT

## 2024-05-07 ENCOUNTER — TREATMENT (OUTPATIENT)
Age: 79
End: 2024-05-07
Payer: OTHER GOVERNMENT

## 2024-05-07 DIAGNOSIS — M25.641 STIFFNESS OF FINGER JOINT OF RIGHT HAND: ICD-10-CM

## 2024-05-07 DIAGNOSIS — S62.609A CLOSED FRACTURE DISLOCATION OF PROXIMAL INTERPHALANGEAL (PIP) JOINT OF FINGER, INITIAL ENCOUNTER: Primary | ICD-10-CM

## 2024-05-07 DIAGNOSIS — M25.241 JOINT LAXITY OF RIGHT HAND: Primary | ICD-10-CM

## 2024-05-07 DIAGNOSIS — M79.644 PAIN OF FINGER OF RIGHT HAND: ICD-10-CM

## 2024-05-07 PROCEDURE — 97763 ORTHC/PROSTC MGMT SBSQ ENC: CPT | Performed by: OCCUPATIONAL THERAPIST

## 2024-05-07 PROCEDURE — 99213 OFFICE O/P EST LOW 20 MIN: CPT | Performed by: ORTHOPAEDIC SURGERY

## 2024-05-07 PROCEDURE — 1123F ACP DISCUSS/DSCN MKR DOCD: CPT | Performed by: ORTHOPAEDIC SURGERY

## 2024-05-07 PROCEDURE — 97110 THERAPEUTIC EXERCISES: CPT | Performed by: OCCUPATIONAL THERAPIST

## 2024-05-07 NOTE — PROGRESS NOTES
Orthopaedic Hand Clinic Note    Name: Steve Chaney  YOB: 1945  Gender: male  MRN: [id number]      Follow up visit:   1. Closed fracture dislocation of proximal interphalangeal (PIP) joint of finger, initial encounter        HPI: Steve Chaney is a 78 y.o. male who is following up for right small finger PIP dislocation. He has been compliant with his splint. .      ROS/Meds/PSH/PMH/FH/SH: I personally reviewed the patients standard intake form.  Pertinents are discussed in the HPI    Physical Examination:    Musculoskeletal Examination:  Examination on the right upper extremity demonstrates cap refill < 5 seconds in all fingers, he is able to perform active PIP extension, unable to perform Zach's test due to limited motion. .    Imaging / Electrodiagnostic Tests:     none    Assessment:     ICD-10-CM    1. Closed fracture dislocation of proximal interphalangeal (PIP) joint of finger, initial encounter  S62.609A           Plan:   We discussed the diagnosis and different treatment options. We discussed observation, therapy, antiinflammatory medications and other pertinent treatment modalities.    After discussing in detail the patient elects to proceed with continued hand therapy. He can begin short arc motion. He will follow up in 4 weeks .     Patient voiced accordance and understanding of the information provided and the formulated plan. All questions were answered to the patient's satisfaction during the encounter.      Kelley Todd MD  Orthopaedic Surgery  05/07/24  12:42 PM

## 2024-05-07 NOTE — PROGRESS NOTES
[facility] OT [facility] ORTHOPAEDICS  [address] [place] [id number]  Dept: [phone]      Occupational Therapy Daily Note     Referring MD: Friend, Kelley MICHELLE MD    Diagnosis:     ICD-10-CM    1. Joint laxity of right hand  M25.241       2. Pain of finger of right hand  M79.644       3. Stiffness of finger joint of right hand  M25.641                Surgery/Medical Dx: Date  DOI  4/5/24  Avulsion fracture central slip R SF, Boutonniere deformity       Therapy precautions: Tendon precautions    History of injury/onset : Fell and tripped on 4/5/ 24, sustained R SF PIP dislocation which he reduced himself,  sustained avulsion fracture central slip R SF, Boutonniere deformity      Payor: Payor: BIANKA BROWN /  /  /  Billing pattern: Government- total time   Total Direct Treatment Time:  40 min  Total In Office Time: 50 min  Modifier needed: No  Episode visit count:  4     Preferred Name: Steve    PERTINENT MEDICAL HISTORY     PMHX & Meds:   Past Medical History:   Diagnosis Date    Anxiety     Arthritis     Aspirin long-term use     has 2 cardiac stents    Asthma     no inhalers    CAD (coronary artery disease)     stented x 2    Chronic pain     back    Diabetes (HCC)     insulin reliant. bs: 200's. pt was not sure of his insulin dose. stated he was taking 70 units because his bottle said \" 70/30\". med list states he is to take 55 units bid.     Former smoker     GERD (gastroesophageal reflux disease)     Hypercholesteremia     Hypertension     Neurological disorder     Stented coronary artery 12 yrs ago    x 2   ,   Past Surgical History:   Procedure Laterality Date    CARDIAC CATHETERIZATION  12 yrs ago    stented x 2    ROTATOR CUFF REPAIR Right       Medications. : Reviewed in chart  Allergies: No Known Allergies     SUBJECTIVE       Patient and wife report splint has been on full time, has kept it dry, is starting to feel a little loose as edema and pain has decreased, performing DIP flexion in the

## 2024-05-10 NOTE — PROGRESS NOTES
[facility] OT [facility] ORTHOPAEDICS  [address] [place] [id number]  Dept: [phone]      Occupational Therapy Reassessment Note     Referring MD: Friend, Kelley MICHELLE MD    Diagnosis:     ICD-10-CM    1. Stiffness of finger joint of right hand  M25.641       2. Pain of finger of right hand  M79.644       3. Upper extremity weakness  R29.898          Surgery/Medical Dx: Date  DOI  4/5/24  Avulsion fracture central slip R SF, Boutonniere deformity       Therapy precautions: Tendon precautions    History of injury/onset : Fell and tripped on 4/5/ 24, sustained R SF PIP dislocation which he reduced himself,  sustained avulsion fracture central slip R SF, Boutonniere deformity      Payor: Payor: BIANKA BROWN /  /  /  Billing pattern: Government- total time   Total Direct Treatment Time:  40 min  Total In Office Time: 55 min  Modifier needed: No  Episode visit count:  5     Preferred Name: Steve    PERTINENT MEDICAL HISTORY     PMHX & Meds:   Past Medical History:   Diagnosis Date    Anxiety     Arthritis     Aspirin long-term use     has 2 cardiac stents    Asthma     no inhalers    CAD (coronary artery disease)     stented x 2    Chronic pain     back    Diabetes (HCC)     insulin reliant. bs: 200's. pt was not sure of his insulin dose. stated he was taking 70 units because his bottle said \" 70/30\". med list states he is to take 55 units bid.     Former smoker     GERD (gastroesophageal reflux disease)     Hypercholesteremia     Hypertension     Neurological disorder     Stented coronary artery 12 yrs ago    x 2   ,   Past Surgical History:   Procedure Laterality Date    CARDIAC CATHETERIZATION  12 yrs ago    stented x 2    ROTATOR CUFF REPAIR Right       Medications. : Reviewed in chart  Allergies: No Known Allergies     SUBJECTIVE     Pt reports only minimal discomfort with initiation of SF protected AROM (short arc motion)    OBJECTIVE     Functional Outcome Measures: Quick Dash  40  score=   65 %

## 2024-05-13 ENCOUNTER — TREATMENT (OUTPATIENT)
Age: 79
End: 2024-05-13
Payer: OTHER GOVERNMENT

## 2024-05-13 DIAGNOSIS — M25.641 STIFFNESS OF FINGER JOINT OF RIGHT HAND: Primary | ICD-10-CM

## 2024-05-13 DIAGNOSIS — R29.898 UPPER EXTREMITY WEAKNESS: ICD-10-CM

## 2024-05-13 DIAGNOSIS — M79.644 PAIN OF FINGER OF RIGHT HAND: ICD-10-CM

## 2024-05-13 PROCEDURE — 97022 WHIRLPOOL THERAPY: CPT | Performed by: OCCUPATIONAL THERAPIST

## 2024-05-13 PROCEDURE — 97763 ORTHC/PROSTC MGMT SBSQ ENC: CPT | Performed by: OCCUPATIONAL THERAPIST

## 2024-05-13 PROCEDURE — 97110 THERAPEUTIC EXERCISES: CPT | Performed by: OCCUPATIONAL THERAPIST

## 2024-05-15 ENCOUNTER — TREATMENT (OUTPATIENT)
Age: 79
End: 2024-05-15

## 2024-05-15 DIAGNOSIS — R29.898 UPPER EXTREMITY WEAKNESS: ICD-10-CM

## 2024-05-15 DIAGNOSIS — M79.644 PAIN OF FINGER OF RIGHT HAND: ICD-10-CM

## 2024-05-15 DIAGNOSIS — M25.641 STIFFNESS OF FINGER JOINT OF RIGHT HAND: Primary | ICD-10-CM

## 2024-05-15 DIAGNOSIS — M25.241 JOINT LAXITY OF RIGHT HAND: ICD-10-CM

## 2024-05-15 NOTE — PROGRESS NOTES
with protected motion is the goal not forceful flexion - Reviewed with pt and wife again and pt performed   (5/15/24)   SHORT ARC AROM:  HB MP blocking exercise template, which positions MP in more flexion than MF/RF, to be used for Reverse blocking to enhance  PIP extension.   Emphasized that trying to aim for full extension with protected AROM primary goal at this time with pt and wife. -Reviewed with pt and wife again and pt performed   (5/15/24)  DIP arom in orthosis (remove distal strap and support PIP in extension in circumferential PIP EXT orthosis)    CLINICAL DECISION MAKING/ASSESSMENT     Pt continues to achieve -10/ PIP active extension by end of session, and is utilizing his exercise templates for short arc motion correctly.  Achieving -10/50 deg PIP AROM (measured laterally due to prominent scar tissue dorsally).   Thoroughly reviewed protected AROM with patient and wife with use of exercise templates with emphasis on PIP ext and gentle flexion as above for simplified HEP.   Progressing very well.    PLAN OF CARE     Continue short arc arom with emphasis on full PIP ext with exercise templates as above for simplified HEP, continue IP static ext splint in between exercises      GOALS   Short Term Goals:  5/14/2024  (4 weeks)  Patient will be I with splinting, HEP and tendon precautions   MET  Will measure and initiate PIP AROM following MD orders   MET 5/13/24  Pt will achieve SF MP flexion of 80 deg   met  Pt will achieve 50 deg or more DIP flexion   progressing continue goal 5/13/24  No pain at rest to R SF and pain no > than 2-3/10 with AROM  met 5/13/24  Decrease edema R SF to minimal   met  Improve Quick DASH functional assessment score to less than 40% deficit   met    Long Term Goals: 6/11/2024  (8 weeks)  Pt will demonstrate SF flexion to within 1 CM of DPC while demonstrating -15 to -10 PIP extension and will be Ind with IADL, resistive home chore and tool use  Pt will increase  strength to

## 2024-05-21 ENCOUNTER — TREATMENT (OUTPATIENT)
Age: 79
End: 2024-05-21
Payer: OTHER GOVERNMENT

## 2024-05-21 DIAGNOSIS — M25.641 STIFFNESS OF FINGER JOINT OF RIGHT HAND: Primary | ICD-10-CM

## 2024-05-21 DIAGNOSIS — M79.644 PAIN OF FINGER OF RIGHT HAND: ICD-10-CM

## 2024-05-21 PROCEDURE — 97110 THERAPEUTIC EXERCISES: CPT

## 2024-05-21 PROCEDURE — 97763 ORTHC/PROSTC MGMT SBSQ ENC: CPT

## 2024-05-22 NOTE — PROGRESS NOTES
[facility] OT [facility] ORTHOPAEDICS  [address] [place] [id number]  Dept: [phone]      Occupational Therapy Daily Note     Referring MD: Friend, Kelley MICHELLE MD    Diagnosis:     ICD-10-CM    1. Stiffness of finger joint of right hand  M25.641       2. Pain of finger of right hand  M79.644            Surgery/Medical Dx: Date  DOI  4/5/24  Avulsion fracture central slip R SF, Boutonniere deformity       Therapy precautions: Tendon precautions    History of injury/onset : Fell and tripped on 4/5/ 24, sustained R SF PIP dislocation which he reduced himself,  sustained avulsion fracture central slip R SF, Boutonniere deformity      Payor: Payor: BIANKA BROWN /  /  /  Billing pattern: Government- total time   Total Direct Treatment Time:  45 min  Total In Office Time: 45 min  Modifier needed: No  Episode visit count:  7     Preferred Name: Steve    PERTINENT MEDICAL HISTORY     PMHX & Meds:   Past Medical History:   Diagnosis Date    Anxiety     Arthritis     Aspirin long-term use     has 2 cardiac stents    Asthma     no inhalers    CAD (coronary artery disease)     stented x 2    Chronic pain     back    Diabetes (HCC)     insulin reliant. bs: 200's. pt was not sure of his insulin dose. stated he was taking 70 units because his bottle said \" 70/30\". med list states he is to take 55 units bid.     Former smoker     GERD (gastroesophageal reflux disease)     Hypercholesteremia     Hypertension     Neurological disorder     Stented coronary artery 12 yrs ago    x 2   ,   Past Surgical History:   Procedure Laterality Date    CARDIAC CATHETERIZATION  12 yrs ago    stented x 2    ROTATOR CUFF REPAIR Right       Medications. : Reviewed in chart  Allergies: No Known Allergies     SUBJECTIVE     Pt and wife report he has been complying with his new short arc, limited flexion (with focus on PIP extension) HEP, is wearing his splint at all other times, they report \"I am glad I can straighten my finger again.\"

## 2024-05-24 ENCOUNTER — TREATMENT (OUTPATIENT)
Age: 79
End: 2024-05-24
Payer: MEDICARE

## 2024-05-24 DIAGNOSIS — M25.641 STIFFNESS OF FINGER JOINT OF RIGHT HAND: Primary | ICD-10-CM

## 2024-05-24 DIAGNOSIS — M79.644 PAIN OF FINGER OF RIGHT HAND: ICD-10-CM

## 2024-05-24 PROCEDURE — 97763 ORTHC/PROSTC MGMT SBSQ ENC: CPT

## 2024-05-24 PROCEDURE — 97110 THERAPEUTIC EXERCISES: CPT

## 2024-05-24 NOTE — PROGRESS NOTES
[facility] OT [facility] ORTHOPAEDICS  [address] [place] [phone]  Dept: [phone]      Occupational Therapy Daily Note     Referring MD: Friend, Kelley MICHELLE MD    Diagnosis:     ICD-10-CM    1. Stiffness of finger joint of right hand  M25.641       2. Pain of finger of right hand  M79.644            Surgery/Medical Dx: Date  DOI  4/5/24  Avulsion fracture central slip R SF, Boutonniere deformity       Therapy precautions: Tendon precautions    History of injury/onset : Fell and tripped on 4/5/ 24, sustained R SF PIP dislocation which he reduced himself,  sustained avulsion fracture central slip R SF, Boutonniere deformity      Payor: Payor: MEDICARE /  /  /  Billing pattern: Government- total time   Total Direct Treatment Time:  30 min  Total In Office Time: 30 min  Modifier needed: No  Episode visit count:  8     Preferred Name: Steve    PERTINENT MEDICAL HISTORY     PMHX & Meds:   Past Medical History:   Diagnosis Date    Anxiety     Arthritis     Aspirin long-term use     has 2 cardiac stents    Asthma     no inhalers    CAD (coronary artery disease)     stented x 2    Chronic pain     back    Diabetes (HCC)     insulin reliant. bs: 200's. pt was not sure of his insulin dose. stated he was taking 70 units because his bottle said \" 70/30\". med list states he is to take 55 units bid.     Former smoker     GERD (gastroesophageal reflux disease)     Hypercholesteremia     Hypertension     Neurological disorder     Stented coronary artery 12 yrs ago    x 2   ,   Past Surgical History:   Procedure Laterality Date    CARDIAC CATHETERIZATION  12 yrs ago    stented x 2    ROTATOR CUFF REPAIR Right       Medications. : Reviewed in chart  Allergies: No Known Allergies     SUBJECTIVE     Pt and wife report he has been complying with his new short arc, limited flexion (with focus on PIP extension) HEP, is wearing his splint at all other times, they report \"I am glad I can straighten my finger again.\"

## 2024-05-29 ENCOUNTER — TREATMENT (OUTPATIENT)
Age: 79
End: 2024-05-29
Payer: MEDICARE

## 2024-05-29 DIAGNOSIS — M79.644 PAIN OF FINGER OF RIGHT HAND: ICD-10-CM

## 2024-05-29 DIAGNOSIS — R29.898 UPPER EXTREMITY WEAKNESS: ICD-10-CM

## 2024-05-29 DIAGNOSIS — M25.641 STIFFNESS OF FINGER JOINT OF RIGHT HAND: Primary | ICD-10-CM

## 2024-05-29 DIAGNOSIS — M25.241 JOINT LAXITY OF RIGHT HAND: ICD-10-CM

## 2024-05-29 PROCEDURE — 97022 WHIRLPOOL THERAPY: CPT | Performed by: OCCUPATIONAL THERAPIST

## 2024-05-29 PROCEDURE — 97035 APP MDLTY 1+ULTRASOUND EA 15: CPT | Performed by: OCCUPATIONAL THERAPIST

## 2024-05-29 PROCEDURE — 97763 ORTHC/PROSTC MGMT SBSQ ENC: CPT | Performed by: OCCUPATIONAL THERAPIST

## 2024-05-29 PROCEDURE — 97110 THERAPEUTIC EXERCISES: CPT | Performed by: OCCUPATIONAL THERAPIST

## 2024-05-29 NOTE — PROGRESS NOTES
[facility] OT [facility] ORTHOPAEDICS  [address] [place] [id number]  Dept: [phone]      Occupational Therapy Daily Note     Referring MD: Friend, Kelley MICHELLE MD    Diagnosis:     ICD-10-CM    1. Stiffness of finger joint of right hand  M25.641       2. Pain of finger of right hand  M79.644       3. Upper extremity weakness  R29.898       4. Joint laxity of right hand  M25.241              Surgery/Medical Dx: Date  DOI  4/5/24  Avulsion fracture central slip R SF, Boutonniere deformity       Therapy precautions: Tendon precautions    History of injury/onset : Fell and tripped on 4/5/ 24, sustained R SF PIP dislocation which he reduced himself,  sustained avulsion fracture central slip R SF, Boutonniere deformity      Payor: Payor: MEDICARE /  /  /  Billing pattern: Government- total time   Total Direct Treatment Time:  40 min  Total In Office Time: 55 min  Modifier needed: No  Episode visit count:  9     Preferred Name: Steve    PERTINENT MEDICAL HISTORY     PMHX & Meds:   Past Medical History:   Diagnosis Date    Anxiety     Arthritis     Aspirin long-term use     has 2 cardiac stents    Asthma     no inhalers    CAD (coronary artery disease)     stented x 2    Chronic pain     back    Diabetes (HCC)     insulin reliant. bs: 200's. pt was not sure of his insulin dose. stated he was taking 70 units because his bottle said \" 70/30\". med list states he is to take 55 units bid.     Former smoker     GERD (gastroesophageal reflux disease)     Hypercholesteremia     Hypertension     Neurological disorder     Stented coronary artery 12 yrs ago    x 2   ,   Past Surgical History:   Procedure Laterality Date    CARDIAC CATHETERIZATION  12 yrs ago    stented x 2    ROTATOR CUFF REPAIR Right       Medications. : Reviewed in chart  Allergies: No Known Allergies     SUBJECTIVE     Pt and wife report he has been complying with his new short arc, limited flexion AROM (with focus on PIP extension) HEP, reports his

## 2024-06-03 ENCOUNTER — TREATMENT (OUTPATIENT)
Age: 79
End: 2024-06-03
Payer: OTHER GOVERNMENT

## 2024-06-03 DIAGNOSIS — R29.898 UPPER EXTREMITY WEAKNESS: ICD-10-CM

## 2024-06-03 DIAGNOSIS — M25.641 STIFFNESS OF FINGER JOINT OF RIGHT HAND: Primary | ICD-10-CM

## 2024-06-03 DIAGNOSIS — M79.644 PAIN OF FINGER OF RIGHT HAND: ICD-10-CM

## 2024-06-03 DIAGNOSIS — M25.241 JOINT LAXITY OF RIGHT HAND: ICD-10-CM

## 2024-06-03 PROCEDURE — 97763 ORTHC/PROSTC MGMT SBSQ ENC: CPT | Performed by: OCCUPATIONAL THERAPIST

## 2024-06-03 PROCEDURE — 97110 THERAPEUTIC EXERCISES: CPT | Performed by: OCCUPATIONAL THERAPIST

## 2024-06-03 PROCEDURE — 97022 WHIRLPOOL THERAPY: CPT | Performed by: OCCUPATIONAL THERAPIST

## 2024-06-03 PROCEDURE — 97035 APP MDLTY 1+ULTRASOUND EA 15: CPT | Performed by: OCCUPATIONAL THERAPIST

## 2024-06-03 NOTE — PROGRESS NOTES
coban taping under splint is helping to decrease PIP swelling, wife and pt report all is going well with splinting HEP      OBJECTIVE     Functional Outcome Measures: Quick Dash  40  score=   65 % functional deficit  Hand/Side Dominance: right handed  Palpation: TTP dorsal PIP jt  Swelling/Edema:  min mod today  Skin Integrity: normal     A/PROM Measures:    Shoulder elbow wrist thumb and other finger screens wnls    Digital AROM: IE IF MF RF SF   MCP    0/70°    PIP    NT   DIP    0/30°    Flexion to DPC 1 1 2               Digital AROM: 5/13/24 IF IF AROM      MCP 0/80 0/80     PIP -10/50 -10/65 (Post tx, measured laterally)     DIP 0/30 0/45     Flexion to DPC                Treatment:    Fluidotherapy (22235) Therapist directed exercise in Fluidotherapy to right hand/wrist for preparation for tx and desensitization, advised no AROM while in fluido, prep for short arc arom to follow.      Orthotic Management and Training Subsequent Encounter (15868) x 15 minutes: subsequent encounter for modifications, fitting adjustments, and additional training.    Repadded PIP extension splint to decrease pressure along dorsal PIP   Adjusted hand based SF MP blocking exercise template (MP in approx 60 flexion and pad pushing proximal phalanx into flexion to optimize PIP extension - reverse PIP blocking exercise)    Ultrasound (95131) x 10 minutes including set up and application of modality to dorsal SF PIP joint, at 3 MHz,   point 8 w/cm2  pulsed with gel assisting in reducing edema/ improve extensor tendon glide/ decrease pain, soft tissue restrictions.        Therapeutic exercise (56958) x 15 min:  Home Exercise Program development and Education: see below.  Patient I with program following instruction and performance  Reviewed SHORT ARC AROM:  Utilizing Template allowiing 50 deg PIP flexion to be used for short arc motion AROM, advised pt with wife present importance of not forcefully tamera against template, gentle

## 2024-06-10 ENCOUNTER — TREATMENT (OUTPATIENT)
Age: 79
End: 2024-06-10
Payer: MEDICARE

## 2024-06-10 DIAGNOSIS — R29.898 UPPER EXTREMITY WEAKNESS: ICD-10-CM

## 2024-06-10 DIAGNOSIS — M25.641 STIFFNESS OF FINGER JOINT OF RIGHT HAND: Primary | ICD-10-CM

## 2024-06-10 DIAGNOSIS — M79.644 PAIN OF FINGER OF RIGHT HAND: ICD-10-CM

## 2024-06-10 DIAGNOSIS — M25.241 JOINT LAXITY OF RIGHT HAND: ICD-10-CM

## 2024-06-10 PROCEDURE — 97022 WHIRLPOOL THERAPY: CPT | Performed by: OCCUPATIONAL THERAPIST

## 2024-06-10 PROCEDURE — 97110 THERAPEUTIC EXERCISES: CPT | Performed by: OCCUPATIONAL THERAPIST

## 2024-06-10 PROCEDURE — 97763 ORTHC/PROSTC MGMT SBSQ ENC: CPT | Performed by: OCCUPATIONAL THERAPIST

## 2024-06-10 PROCEDURE — 97035 APP MDLTY 1+ULTRASOUND EA 15: CPT | Performed by: OCCUPATIONAL THERAPIST

## 2024-06-10 NOTE — PROGRESS NOTES
coban taping under splint is helping to decrease PIP swelling, wife and pt report all is going well with splinting HEP      OBJECTIVE     Functional Outcome Measures: Quick Dash  40  score=   65 % functional deficit  Hand/Side Dominance: right handed  Palpation: TTP dorsal PIP jt  Swelling/Edema:  min mod today  Skin Integrity: normal     A/PROM Measures:    Shoulder elbow wrist thumb and other finger screens wnls    Digital AROM: IE IF MF RF SF   MCP    0/70°    PIP    NT   DIP    0/30°    Flexion to DPC 1 1 2               Digital AROM: 5/13/24 IF IF AROM  IF AROM (6/10/24)    MCP 0/80 0/80 0/90    PIP -10/50 -10/65 (Post tx, measured laterally) -10/80 (Post tx, measured laterally)    DIP 0/30 0/45 0/50    Flexion to DPC                Treatment:    Fluidotherapy (20975) Therapist directed exercise in Fluidotherapy to right hand/wrist for preparation for tx and desensitization, advised no AROM while in fluido, prep for short arc arom to follow.      Orthotic Management and Training Subsequent Encounter (69311) x 10 minutes: subsequent encounter for modifications, fitting adjustments, and additional training.    Rewarmed and remolded PIP extension splint due to decreasing edema and to decrease pressure along dorsal PIP,  restrapped/applied new velcro    Ultrasound (96869) x 10 minutes including set up and application of modality to dorsal SF PIP joint, at 3 MHz,   point 8 w/cm2  pulsed with gel assisting in reducing edema/ improve extensor tendon glide/ decrease pain, soft tissue restrictions.        Therapeutic exercise (11576) x 15 min:  Home Exercise Program development and Education: see below.  Patient I with program following instruction and performance  Reviewed SHORT ARC AROM:  Utilizing Template allowiing 50 deg PIP flexion to be used for short arc motion AROM, advised pt with wife present importance of not forcefully tamera against template, gentle flexion only, emphasized that trying to aim for full

## 2024-06-18 ENCOUNTER — TREATMENT (OUTPATIENT)
Age: 79
End: 2024-06-18
Payer: OTHER GOVERNMENT

## 2024-06-18 DIAGNOSIS — M25.641 STIFFNESS OF FINGER JOINT OF RIGHT HAND: Primary | ICD-10-CM

## 2024-06-18 DIAGNOSIS — R29.898 UPPER EXTREMITY WEAKNESS: ICD-10-CM

## 2024-06-18 DIAGNOSIS — M79.644 PAIN OF FINGER OF RIGHT HAND: ICD-10-CM

## 2024-06-18 DIAGNOSIS — M25.241 JOINT LAXITY OF RIGHT HAND: ICD-10-CM

## 2024-06-18 PROCEDURE — 97110 THERAPEUTIC EXERCISES: CPT | Performed by: OCCUPATIONAL THERAPIST

## 2024-06-18 PROCEDURE — 97022 WHIRLPOOL THERAPY: CPT | Performed by: OCCUPATIONAL THERAPIST

## 2024-06-18 PROCEDURE — 97035 APP MDLTY 1+ULTRASOUND EA 15: CPT | Performed by: OCCUPATIONAL THERAPIST

## 2024-06-18 PROCEDURE — 97763 ORTHC/PROSTC MGMT SBSQ ENC: CPT | Performed by: OCCUPATIONAL THERAPIST

## 2024-06-18 NOTE — PROGRESS NOTES
[facility] OT [facility] ORTHOPAEDICS  [address] [place] [phone]  Dept: [phone]      Occupational Therapy Daily Note     Referring MD: Friend, Kelley MICHELLE MD    Diagnosis:     ICD-10-CM    1. Stiffness of finger joint of right hand  M25.641       2. Pain of finger of right hand  M79.644       3. Upper extremity weakness  R29.898       4. Joint laxity of right hand  M25.241             Surgery/Medical Dx: Date  DOI  4/5/24  Avulsion fracture central slip R SF, Boutonniere deformity       Therapy precautions: Tendon precautions    History of injury/onset : Fell and tripped on 4/5/ 24, sustained R SF PIP dislocation which he reduced himself,  sustained avulsion fracture central slip R SF, Boutonniere deformity      Payor: Payor: BIANKA BROWN /  /  /  Billing pattern: Government- total time   Total Direct Treatment Time:  45 min  Total In Office Time: 55 min  Modifier needed: No  Episode visit count:  12     Preferred Name: Steve    PERTINENT MEDICAL HISTORY     PMHX & Meds:   Past Medical History:   Diagnosis Date    Anxiety     Arthritis     Aspirin long-term use     has 2 cardiac stents    Asthma     no inhalers    CAD (coronary artery disease)     stented x 2    Chronic pain     back    Diabetes (HCC)     insulin reliant. bs: 200's. pt was not sure of his insulin dose. stated he was taking 70 units because his bottle said \" 70/30\". med list states he is to take 55 units bid.     Former smoker     GERD (gastroesophageal reflux disease)     Hypercholesteremia     Hypertension     Neurological disorder     Stented coronary artery 12 yrs ago    x 2   ,   Past Surgical History:   Procedure Laterality Date    CARDIAC CATHETERIZATION  12 yrs ago    stented x 2    ROTATOR CUFF REPAIR Right       Medications. : Reviewed in chart  Allergies: No Known Allergies     SUBJECTIVE     Pt and wife report he has been complying with his precautions, no forceful flexion or gripping,  AROM focusing  on PIP extension vs

## 2024-06-25 ENCOUNTER — TREATMENT (OUTPATIENT)
Age: 79
End: 2024-06-25
Payer: OTHER GOVERNMENT

## 2024-06-25 DIAGNOSIS — M25.641 STIFFNESS OF FINGER JOINT OF RIGHT HAND: Primary | ICD-10-CM

## 2024-06-25 DIAGNOSIS — M79.644 PAIN OF FINGER OF RIGHT HAND: ICD-10-CM

## 2024-06-25 DIAGNOSIS — M25.441 EFFUSION OF JOINT OF RIGHT HAND: ICD-10-CM

## 2024-06-25 DIAGNOSIS — R29.898 UPPER EXTREMITY WEAKNESS: ICD-10-CM

## 2024-06-25 PROCEDURE — 97110 THERAPEUTIC EXERCISES: CPT | Performed by: OCCUPATIONAL THERAPIST

## 2024-06-25 PROCEDURE — 97035 APP MDLTY 1+ULTRASOUND EA 15: CPT | Performed by: OCCUPATIONAL THERAPIST

## 2024-06-25 PROCEDURE — 97022 WHIRLPOOL THERAPY: CPT | Performed by: OCCUPATIONAL THERAPIST

## 2024-06-25 PROCEDURE — 97763 ORTHC/PROSTC MGMT SBSQ ENC: CPT | Performed by: OCCUPATIONAL THERAPIST

## 2024-06-25 NOTE — PROGRESS NOTES
G[facility] OT [facility] ORTHOPAEDICS  [address] [place] [phone]  Dept: [phone]      Occupational Therapy Daily Note     Referring MD: Friend, Kelley MICHELLE MD    Diagnosis:     ICD-10-CM    1. Stiffness of finger joint of right hand  M25.641       2. Pain of finger of right hand  M79.644       3. Upper extremity weakness  R29.898       4. Effusion of joint of right hand  M25.441               Surgery/Medical Dx: Date  DOI  4/5/24  Avulsion fracture central slip R SF, Boutonniere deformity       Therapy precautions: Tendon precautions    History of injury/onset : Fell and tripped on 4/5/ 24, sustained R SF PIP dislocation which he reduced himself,  sustained avulsion fracture central slip R SF, Boutonniere deformity      Payor: Payor: BIANKA BROWN /  /  /  Billing pattern: Government- total time   Total Direct Treatment Time:  50 min  Total In Office Time: 60  min  Modifier needed: No  Episode visit count:  13     Preferred Name: Steve    PERTINENT MEDICAL HISTORY     PMHX & Meds:   Past Medical History:   Diagnosis Date    Anxiety     Arthritis     Aspirin long-term use     has 2 cardiac stents    Asthma     no inhalers    CAD (coronary artery disease)     stented x 2    Chronic pain     back    Diabetes (HCC)     insulin reliant. bs: 200's. pt was not sure of his insulin dose. stated he was taking 70 units because his bottle said \" 70/30\". med list states he is to take 55 units bid.     Former smoker     GERD (gastroesophageal reflux disease)     Hypercholesteremia     Hypertension     Neurological disorder     Stented coronary artery 12 yrs ago    x 2   ,   Past Surgical History:   Procedure Laterality Date    CARDIAC CATHETERIZATION  12 yrs ago    stented x 2    ROTATOR CUFF REPAIR Right       Medications. : Reviewed in chart  Allergies: No Known Allergies     SUBJECTIVE     Pt reports his finger is feeling better, still complying with his precautions, no forceful flexion or gripping,  and wearing

## 2024-07-05 ENCOUNTER — TREATMENT (OUTPATIENT)
Age: 79
End: 2024-07-05

## 2024-07-05 DIAGNOSIS — M25.641 STIFFNESS OF FINGER JOINT OF RIGHT HAND: Primary | ICD-10-CM

## 2024-07-05 DIAGNOSIS — R29.898 UPPER EXTREMITY WEAKNESS: ICD-10-CM

## 2024-07-05 DIAGNOSIS — M79.644 PAIN OF FINGER OF RIGHT HAND: ICD-10-CM

## 2024-07-05 NOTE — PROGRESS NOTES
GVL OT [facility] ORTHOPAEDICS  [address] [place] [phone]  Dept: [phone]      Occupational Therapy Daily Note     Referring MD: Friend, Kelley MICHELLE MD    Diagnosis:     ICD-10-CM    1. Stiffness of finger joint of right hand  M25.641       2. Pain of finger of right hand  M79.644       3. Upper extremity weakness  R29.898                 Surgery/Medical Dx: Date  DOI  4/5/24  Avulsion fracture central slip R SF, Boutonniere deformity       Therapy precautions: Tendon precautions    History of injury/onset : Fell and tripped on 4/5/ 24, sustained R SF PIP dislocation which he reduced himself,  sustained avulsion fracture central slip R SF, Boutonniere deformity      Payor: Payor: MEDICARE /  /  /  Billing pattern: Government- total time   Total Direct Treatment Time:  50 min  Total In Office Time: 60  min  Modifier needed: No  Episode visit count:  14     Preferred Name: Steve    PERTINENT MEDICAL HISTORY     PMHX & Meds:   Past Medical History:   Diagnosis Date    Anxiety     Arthritis     Aspirin long-term use     has 2 cardiac stents    Asthma     no inhalers    CAD (coronary artery disease)     stented x 2    Chronic pain     back    Diabetes (HCC)     insulin reliant. bs: 200's. pt was not sure of his insulin dose. stated he was taking 70 units because his bottle said \" 70/30\". med list states he is to take 55 units bid.     Former smoker     GERD (gastroesophageal reflux disease)     Hypercholesteremia     Hypertension     Neurological disorder     Stented coronary artery 12 yrs ago    x 2   ,   Past Surgical History:   Procedure Laterality Date    CARDIAC CATHETERIZATION  12 yrs ago    stented x 2    ROTATOR CUFF REPAIR Right       Medications. : Reviewed in chart  Allergies: No Known Allergies     SUBJECTIVE     Pt reports his finger is feeling better, still complying with his precautions, no forceful flexion or gripping,  and wearing his PIP extension splint in between AROM, all

## 2024-07-08 ENCOUNTER — OFFICE VISIT (OUTPATIENT)
Age: 79
End: 2024-07-08
Payer: OTHER GOVERNMENT

## 2024-07-08 DIAGNOSIS — S62.609A CLOSED FRACTURE DISLOCATION OF PROXIMAL INTERPHALANGEAL (PIP) JOINT OF FINGER, INITIAL ENCOUNTER: Primary | ICD-10-CM

## 2024-07-08 PROCEDURE — 1123F ACP DISCUSS/DSCN MKR DOCD: CPT | Performed by: ORTHOPAEDIC SURGERY

## 2024-07-08 PROCEDURE — 99213 OFFICE O/P EST LOW 20 MIN: CPT | Performed by: ORTHOPAEDIC SURGERY

## 2024-07-08 NOTE — PROGRESS NOTES
Orthopaedic Hand Clinic Note    Name: Steve Chaney  YOB: 1945  Gender: male  MRN: [id number]      Follow up visit:   1. Closed fracture dislocation of proximal interphalangeal (PIP) joint of finger, initial encounter        HPI: Steve Chaney is a 78 y.o. male who is following up for right small finger PIP dislocation. He has been compliant with his splint. .      ROS/Meds/PSH/PMH/FH/SH: I personally reviewed the patients standard intake form.  Pertinents are discussed in the HPI    Physical Examination:    Musculoskeletal Examination:  Examination on the right upper extremity demonstrates cap refill < 5 seconds in all fingers, he has  a 20 degree extension lag at the PIP, and is able to perform 80 degrees of flexion    none    Assessment:     ICD-10-CM    1. Closed fracture dislocation of proximal interphalangeal (PIP) joint of finger, initial encounter  S62.609A           Plan:   We discussed the diagnosis and different treatment options. We discussed observation, therapy, antiinflammatory medications and other pertinent treatment modalities.  He has completed his course of therapy.  He can discontinue use of his brace and advance activities as tolerated.  He will follow-up as needed    Patient voiced accordance and understanding of the information provided and the formulated plan. All questions were answered to the patient's satisfaction during the encounter.      Kelley Todd MD  Orthopaedic Surgery  07/08/24  10:11 AM

## (undated) DEVICE — KENDALL SCD EXPRESS SLEEVES, KNEE LENGTH, MEDIUM: Brand: KENDALL SCD

## (undated) DEVICE — AMD ANTIMICROBIAL GAUZE SPONGES,12 PLY USP TYPE VII, 0.2% POLYHEXAMETHYLENE BIGUANIDE HCI (PHMB): Brand: CURITY

## (undated) DEVICE — FLOSEAL MATRIX IS INDICATED IN SURGICAL PROCEDURES (OTHER THAN IN OPHTHALMIC) AS AN ADJUNCT TO HEMOSTASIS WHEN CONTROL OF BLEEDING BY LIGATURE OR CONVENTIONALPROCEDURES IS INEFFECTIVE OR IMPRACTICAL.: Brand: FLOSEAL HEMOSTATIC MATRIX

## (undated) DEVICE — PACK PROCEDURE SURG POST LAMINECTOMY CDS

## (undated) DEVICE — SUTURE VCRL SZ 2-0 L27IN ABSRB UD L36MM CP-1 1/2 CIR REV J266H

## (undated) DEVICE — 2000CC GUARDIAN II: Brand: GUARDIAN

## (undated) DEVICE — DRAPE SHT 3 QTR PROXIMA 53X77 --

## (undated) DEVICE — DRAPE TWL SURG 16X26IN BLU ORB04] ALLCARE INC]

## (undated) DEVICE — 1010 S-DRAPE TOWEL DRAPE 10/BX: Brand: STERI-DRAPE™

## (undated) DEVICE — SOLUTION IV 1000ML 0.9% SOD CHL

## (undated) DEVICE — GOWN,REINF,POLY,ECL,PP SLV,XL: Brand: MEDLINE

## (undated) DEVICE — SUTURE VCRL + SZ 3-0 L18IN ABSRB UD PS-2 3/8 CIR REV CUT VCP497H

## (undated) DEVICE — NEEDLE HYPO 21GA L1.5IN INTRAMUSCULAR S STL LATCH BVL UP

## (undated) DEVICE — (D)SYR 10ML 1/5ML GRAD NSAF -- PKGING CHANGE USE ITEM 338027

## (undated) DEVICE — INTENDED FOR TISSUE SEPARATION, AND OTHER PROCEDURES THAT REQUIRE A SHARP SURGICAL BLADE TO PUNCTURE OR CUT.: Brand: BARD-PARKER SAFETY BLADES SIZE 15, STERILE

## (undated) DEVICE — DRAPE XR C ARM 41X74IN LF --

## (undated) DEVICE — REM POLYHESIVE ADULT PATIENT RETURN ELECTRODE: Brand: VALLEYLAB

## (undated) DEVICE — 3M™ STERI-STRIP™ REINFORCED ADHESIVE SKIN CLOSURES, R1548, 1 IN X 5 IN (25 MM X 125 MM), 4 STRIPS/ENVELOPE: Brand: 3M™ STERI-STRIP™

## (undated) DEVICE — MASTISOL ADHESIVE LIQ 2/3ML

## (undated) DEVICE — BUTTON SWITCH PENCIL BLADE ELECTRODE, HOLSTER: Brand: EDGE

## (undated) DEVICE — 5.0MM PRECISION ROUND

## (undated) DEVICE — INTENDED FOR TISSUE SEPARATION, AND OTHER PROCEDURES THAT REQUIRE A SHARP SURGICAL BLADE TO PUNCTURE OR CUT.: Brand: BARD-PARKER SAFETY BLADES SIZE 10, STERILE

## (undated) DEVICE — (D)PREP SKN CHLRAPRP APPL 26ML -- CONVERT TO ITEM 371833

## (undated) DEVICE — SUTURE VCRL SZ 1 L27IN ABSRB UD L36MM CP-1 1/2 CIR REV CUT J268H

## (undated) DEVICE — WAX SURG 2.5GM HEMSTAT BNE BEESWAX PARAFFIN ISO PALMITATE